# Patient Record
Sex: FEMALE | Race: WHITE | HISPANIC OR LATINO | Employment: UNEMPLOYED | URBAN - METROPOLITAN AREA
[De-identification: names, ages, dates, MRNs, and addresses within clinical notes are randomized per-mention and may not be internally consistent; named-entity substitution may affect disease eponyms.]

---

## 2016-08-29 LAB
EXTERNAL ABO GROUPING: NORMAL
EXTERNAL ANTIBODY SCREEN: NORMAL
EXTERNAL HEMATOCRIT: 34.4 %
EXTERNAL HEMOGLOBIN: 10.4 G/DL
EXTERNAL HEPATITIS B SURFACE ANTIGEN: NEGATIVE
EXTERNAL HIV-1 ANTIBODY: NEGATIVE
EXTERNAL PLATELET COUNT: 321 K/ΜL
EXTERNAL RH FACTOR: POSITIVE
EXTERNAL RUBELLA IGG QUANTITATION: NORMAL
EXTERNAL SYPHILIS RPR SCREEN: NORMAL

## 2017-01-12 ENCOUNTER — GENERIC CONVERSION - ENCOUNTER (OUTPATIENT)
Dept: OTHER | Facility: OTHER | Age: 27
End: 2017-01-12

## 2017-01-12 ENCOUNTER — ALLSCRIPTS OFFICE VISIT (OUTPATIENT)
Dept: OTHER | Facility: OTHER | Age: 27
End: 2017-01-12

## 2017-01-25 ENCOUNTER — TRANSCRIBE ORDERS (OUTPATIENT)
Dept: ADMINISTRATIVE | Facility: HOSPITAL | Age: 27
End: 2017-01-25

## 2017-01-25 ENCOUNTER — APPOINTMENT (OUTPATIENT)
Dept: LAB | Facility: HOSPITAL | Age: 27
End: 2017-01-25
Payer: COMMERCIAL

## 2017-01-25 DIAGNOSIS — Z34.90 PRENATAL CARE, UNSPECIFIED TRIMESTER: ICD-10-CM

## 2017-01-25 DIAGNOSIS — Z34.90 PRENATAL CARE, UNSPECIFIED TRIMESTER: Primary | ICD-10-CM

## 2017-01-25 LAB
BASOPHILS # BLD AUTO: 0.02 THOUSANDS/ΜL (ref 0–0.1)
BASOPHILS NFR BLD AUTO: 0 % (ref 0–1)
EOSINOPHIL # BLD AUTO: 0.15 THOUSAND/ΜL (ref 0–0.61)
EOSINOPHIL NFR BLD AUTO: 2 % (ref 0–6)
ERYTHROCYTE [DISTWIDTH] IN BLOOD BY AUTOMATED COUNT: 13.7 % (ref 11.6–15.1)
GLUCOSE 1H P 50 G GLC PO SERPL-MCNC: 161 MG/DL
HCT VFR BLD AUTO: 32.9 % (ref 34.8–46.1)
HGB BLD-MCNC: 10.9 G/DL (ref 11.5–15.4)
LYMPHOCYTES # BLD AUTO: 2.14 THOUSANDS/ΜL (ref 0.6–4.47)
LYMPHOCYTES NFR BLD AUTO: 22 % (ref 14–44)
MCH RBC QN AUTO: 27.9 PG (ref 26.8–34.3)
MCHC RBC AUTO-ENTMCNC: 33.1 G/DL (ref 31.4–37.4)
MCV RBC AUTO: 84 FL (ref 82–98)
MONOCYTES # BLD AUTO: 0.55 THOUSAND/ΜL (ref 0.17–1.22)
MONOCYTES NFR BLD AUTO: 6 % (ref 4–12)
NEUTROPHILS # BLD AUTO: 6.81 THOUSANDS/ΜL (ref 1.85–7.62)
NEUTS SEG NFR BLD AUTO: 70 % (ref 43–75)
NRBC BLD AUTO-RTO: 0 /100 WBCS
PLATELET # BLD AUTO: 275 THOUSANDS/UL (ref 149–390)
PMV BLD AUTO: 9.4 FL (ref 8.9–12.7)
RBC # BLD AUTO: 3.9 MILLION/UL (ref 3.81–5.12)
RPR SER QL: NORMAL
WBC # BLD AUTO: 9.67 THOUSAND/UL (ref 4.31–10.16)

## 2017-01-25 PROCEDURE — 85025 COMPLETE CBC W/AUTO DIFF WBC: CPT

## 2017-01-25 PROCEDURE — 86592 SYPHILIS TEST NON-TREP QUAL: CPT

## 2017-01-25 PROCEDURE — 36415 COLL VENOUS BLD VENIPUNCTURE: CPT

## 2017-01-25 PROCEDURE — 82950 GLUCOSE TEST: CPT

## 2017-01-26 ENCOUNTER — GENERIC CONVERSION - ENCOUNTER (OUTPATIENT)
Dept: OTHER | Facility: OTHER | Age: 27
End: 2017-01-26

## 2017-01-26 DIAGNOSIS — O99.810 ABNORMAL GLUCOSE COMPLICATING PREGNANCY: ICD-10-CM

## 2017-01-31 ENCOUNTER — GENERIC CONVERSION - ENCOUNTER (OUTPATIENT)
Dept: OTHER | Facility: OTHER | Age: 27
End: 2017-01-31

## 2017-02-02 ENCOUNTER — ALLSCRIPTS OFFICE VISIT (OUTPATIENT)
Dept: OTHER | Facility: OTHER | Age: 27
End: 2017-02-02

## 2017-02-08 ENCOUNTER — APPOINTMENT (OUTPATIENT)
Dept: LAB | Facility: HOSPITAL | Age: 27
End: 2017-02-08
Payer: COMMERCIAL

## 2017-02-08 DIAGNOSIS — O99.810 ABNORMAL GLUCOSE COMPLICATING PREGNANCY: ICD-10-CM

## 2017-02-08 LAB
GLUCOSE 1H P 100 G GLC PO SERPL-MCNC: 174 MG/DL (ref 65–179)
GLUCOSE 2H P 100 G GLC PO SERPL-MCNC: 209 MG/DL (ref 65–154)
GLUCOSE 3H P 100 G GLC PO SERPL-MCNC: 168 MG/DL (ref 65–139)
GLUCOSE P FAST SERPL-MCNC: 82 MG/DL (ref 65–99)

## 2017-02-08 PROCEDURE — 36415 COLL VENOUS BLD VENIPUNCTURE: CPT

## 2017-02-08 PROCEDURE — 82951 GLUCOSE TOLERANCE TEST (GTT): CPT

## 2017-02-08 PROCEDURE — 82952 GTT-ADDED SAMPLES: CPT

## 2017-02-14 ENCOUNTER — GENERIC CONVERSION - ENCOUNTER (OUTPATIENT)
Dept: OTHER | Facility: OTHER | Age: 27
End: 2017-02-14

## 2017-02-20 ENCOUNTER — APPOINTMENT (OUTPATIENT)
Dept: PERINATAL CARE | Facility: CLINIC | Age: 27
End: 2017-02-20
Payer: COMMERCIAL

## 2017-02-20 PROCEDURE — G0108 DIAB MANAGE TRN  PER INDIV: HCPCS | Performed by: OBSTETRICS & GYNECOLOGY

## 2017-02-21 ENCOUNTER — GENERIC CONVERSION - ENCOUNTER (OUTPATIENT)
Dept: OTHER | Facility: OTHER | Age: 27
End: 2017-02-21

## 2017-02-21 ENCOUNTER — ALLSCRIPTS OFFICE VISIT (OUTPATIENT)
Dept: PERINATAL CARE | Facility: CLINIC | Age: 27
End: 2017-02-21
Payer: COMMERCIAL

## 2017-02-21 PROCEDURE — 76816 OB US FOLLOW-UP PER FETUS: CPT | Performed by: OBSTETRICS & GYNECOLOGY

## 2017-02-27 ENCOUNTER — APPOINTMENT (OUTPATIENT)
Dept: PERINATAL CARE | Facility: CLINIC | Age: 27
End: 2017-02-27
Payer: COMMERCIAL

## 2017-02-27 PROCEDURE — G0109 DIAB MANAGE TRN IND/GROUP: HCPCS | Performed by: OBSTETRICS & GYNECOLOGY

## 2017-02-28 ENCOUNTER — GENERIC CONVERSION - ENCOUNTER (OUTPATIENT)
Dept: OTHER | Facility: OTHER | Age: 27
End: 2017-02-28

## 2017-03-13 ENCOUNTER — GENERIC CONVERSION - ENCOUNTER (OUTPATIENT)
Dept: OTHER | Facility: OTHER | Age: 27
End: 2017-03-13

## 2017-03-20 ENCOUNTER — ALLSCRIPTS OFFICE VISIT (OUTPATIENT)
Dept: OTHER | Facility: OTHER | Age: 27
End: 2017-03-20

## 2017-03-20 ENCOUNTER — GENERIC CONVERSION - ENCOUNTER (OUTPATIENT)
Dept: OTHER | Facility: OTHER | Age: 27
End: 2017-03-20

## 2017-03-22 ENCOUNTER — GENERIC CONVERSION - ENCOUNTER (OUTPATIENT)
Dept: OTHER | Facility: OTHER | Age: 27
End: 2017-03-22

## 2017-03-31 ENCOUNTER — ANESTHESIA (INPATIENT)
Dept: LABOR AND DELIVERY | Facility: HOSPITAL | Age: 27
End: 2017-03-31
Payer: COMMERCIAL

## 2017-03-31 ENCOUNTER — HOSPITAL ENCOUNTER (INPATIENT)
Facility: HOSPITAL | Age: 27
LOS: 2 days | Discharge: HOME | End: 2017-04-02
Attending: OBSTETRICS & GYNECOLOGY | Admitting: OBSTETRICS & GYNECOLOGY
Payer: COMMERCIAL

## 2017-03-31 ENCOUNTER — ANESTHESIA EVENT (INPATIENT)
Dept: LABOR AND DELIVERY | Facility: HOSPITAL | Age: 27
End: 2017-03-31
Payer: COMMERCIAL

## 2017-03-31 DIAGNOSIS — Z3A.39 39 WEEKS GESTATION OF PREGNANCY: Primary | ICD-10-CM

## 2017-03-31 DIAGNOSIS — Z98.891 HX OF CESAREAN SECTION: ICD-10-CM

## 2017-03-31 PROBLEM — O24.410 DIET CONTROLLED GESTATIONAL DIABETES MELLITUS (GDM) IN THIRD TRIMESTER: Status: ACTIVE | Noted: 2017-03-31

## 2017-03-31 PROBLEM — Z30.2 STERILIZATION: Status: ACTIVE | Noted: 2017-03-31

## 2017-03-31 LAB
ABO GROUP BLD: NORMAL
BASE EXCESS BLDCOV CALC-SCNC: -4.1 MMOL/L (ref 1–9)
BASOPHILS # BLD AUTO: 0.05 THOUSANDS/ΜL (ref 0–0.1)
BASOPHILS NFR BLD AUTO: 1 % (ref 0–1)
BLD GP AB SCN SERPL QL: NEGATIVE
EOSINOPHIL # BLD AUTO: 0.12 THOUSAND/ΜL (ref 0–0.61)
EOSINOPHIL NFR BLD AUTO: 1 % (ref 0–6)
ERYTHROCYTE [DISTWIDTH] IN BLOOD BY AUTOMATED COUNT: 15.3 % (ref 11.6–15.1)
GLUCOSE SERPL-MCNC: 69 MG/DL (ref 65–140)
HCO3 BLDCOV-SCNC: 21.3 MMOL/L (ref 12.2–28.6)
HCT VFR BLD AUTO: 35.6 % (ref 34.8–46.1)
HGB BLD-MCNC: 11.3 G/DL (ref 11.5–15.4)
LYMPHOCYTES # BLD AUTO: 2.74 THOUSANDS/ΜL (ref 0.6–4.47)
LYMPHOCYTES NFR BLD AUTO: 30 % (ref 14–44)
MCH RBC QN AUTO: 25.7 PG (ref 26.8–34.3)
MCHC RBC AUTO-ENTMCNC: 31.7 G/DL (ref 31.4–37.4)
MCV RBC AUTO: 81 FL (ref 82–98)
MONOCYTES # BLD AUTO: 0.51 THOUSAND/ΜL (ref 0.17–1.22)
MONOCYTES NFR BLD AUTO: 6 % (ref 4–12)
NEUTROPHILS # BLD AUTO: 5.81 THOUSANDS/ΜL (ref 1.85–7.62)
NEUTS SEG NFR BLD AUTO: 62 % (ref 43–75)
NRBC BLD AUTO-RTO: 0 /100 WBCS
OXYHGB MFR BLDCOV: 58.6 %
PCO2 BLDCOV: 39.9 MM HG (ref 27–43)
PH BLDCOV: 7.34 [PH] (ref 7.19–7.49)
PLATELET # BLD AUTO: 263 THOUSANDS/UL (ref 149–390)
PMV BLD AUTO: 9.5 FL (ref 8.9–12.7)
PO2 BLDCOV: 28.7 MM HG (ref 15–45)
RBC # BLD AUTO: 4.39 MILLION/UL (ref 3.81–5.12)
RH BLD: POSITIVE
RPR SER QL: NORMAL
SAO2 % BLDCOV: 10.3 ML/DL
WBC # BLD AUTO: 9.23 THOUSAND/UL (ref 4.31–10.16)

## 2017-03-31 PROCEDURE — 85025 COMPLETE CBC W/AUTO DIFF WBC: CPT | Performed by: OBSTETRICS & GYNECOLOGY

## 2017-03-31 PROCEDURE — 86901 BLOOD TYPING SEROLOGIC RH(D): CPT | Performed by: OBSTETRICS & GYNECOLOGY

## 2017-03-31 PROCEDURE — 86592 SYPHILIS TEST NON-TREP QUAL: CPT | Performed by: OBSTETRICS & GYNECOLOGY

## 2017-03-31 PROCEDURE — 86850 RBC ANTIBODY SCREEN: CPT | Performed by: OBSTETRICS & GYNECOLOGY

## 2017-03-31 PROCEDURE — 0UB70ZZ EXCISION OF BILATERAL FALLOPIAN TUBES, OPEN APPROACH: ICD-10-PCS | Performed by: OBSTETRICS & GYNECOLOGY

## 2017-03-31 PROCEDURE — 4A1HXCZ MONITORING OF PRODUCTS OF CONCEPTION, CARDIAC RATE, EXTERNAL APPROACH: ICD-10-PCS | Performed by: OBSTETRICS & GYNECOLOGY

## 2017-03-31 PROCEDURE — 88302 TISSUE EXAM BY PATHOLOGIST: CPT | Performed by: OBSTETRICS & GYNECOLOGY

## 2017-03-31 PROCEDURE — 86900 BLOOD TYPING SEROLOGIC ABO: CPT | Performed by: OBSTETRICS & GYNECOLOGY

## 2017-03-31 PROCEDURE — 82805 BLOOD GASES W/O2 SATURATION: CPT | Performed by: OBSTETRICS & GYNECOLOGY

## 2017-03-31 PROCEDURE — 82948 REAGENT STRIP/BLOOD GLUCOSE: CPT

## 2017-03-31 RX ORDER — IBUPROFEN 600 MG/1
600 TABLET ORAL EVERY 6 HOURS PRN
Status: DISCONTINUED | OUTPATIENT
Start: 2017-04-01 | End: 2017-04-02 | Stop reason: HOSPADM

## 2017-03-31 RX ORDER — SODIUM CHLORIDE 9 MG/ML
125 INJECTION, SOLUTION INTRAVENOUS CONTINUOUS
Status: DISCONTINUED | OUTPATIENT
Start: 2017-03-31 | End: 2017-04-02 | Stop reason: HOSPADM

## 2017-03-31 RX ORDER — OXYCODONE HYDROCHLORIDE AND ACETAMINOPHEN 5; 325 MG/1; MG/1
2 TABLET ORAL EVERY 4 HOURS PRN
Status: DISPENSED | OUTPATIENT
Start: 2017-03-31 | End: 2017-04-01

## 2017-03-31 RX ORDER — OXYCODONE HYDROCHLORIDE AND ACETAMINOPHEN 5; 325 MG/1; MG/1
2 TABLET ORAL EVERY 4 HOURS PRN
Status: DISCONTINUED | OUTPATIENT
Start: 2017-04-01 | End: 2017-04-02 | Stop reason: HOSPADM

## 2017-03-31 RX ORDER — ACETAMINOPHEN 325 MG/1
650 TABLET ORAL EVERY 6 HOURS PRN
Status: DISCONTINUED | OUTPATIENT
Start: 2017-03-31 | End: 2017-04-02 | Stop reason: HOSPADM

## 2017-03-31 RX ORDER — NALBUPHINE HCL 10 MG/ML
5 AMPUL (ML) INJECTION
Status: ACTIVE | OUTPATIENT
Start: 2017-03-31 | End: 2017-04-01

## 2017-03-31 RX ORDER — MORPHINE SULFATE 0.5 MG/ML
INJECTION, SOLUTION EPIDURAL; INTRATHECAL; INTRAVENOUS
Status: COMPLETED
Start: 2017-03-31 | End: 2017-03-31

## 2017-03-31 RX ORDER — ONDANSETRON 2 MG/ML
4 INJECTION INTRAMUSCULAR; INTRAVENOUS EVERY 8 HOURS PRN
Status: DISCONTINUED | OUTPATIENT
Start: 2017-03-31 | End: 2017-03-31

## 2017-03-31 RX ORDER — KETOROLAC TROMETHAMINE 30 MG/ML
INJECTION, SOLUTION INTRAMUSCULAR; INTRAVENOUS AS NEEDED
Status: DISCONTINUED | OUTPATIENT
Start: 2017-03-31 | End: 2017-03-31 | Stop reason: SURG

## 2017-03-31 RX ORDER — BUPIVACAINE HYDROCHLORIDE 7.5 MG/ML
INJECTION, SOLUTION INTRASPINAL AS NEEDED
Status: DISCONTINUED | OUTPATIENT
Start: 2017-03-31 | End: 2017-03-31 | Stop reason: SURG

## 2017-03-31 RX ORDER — ONDANSETRON 2 MG/ML
4 INJECTION INTRAMUSCULAR; INTRAVENOUS ONCE
Status: DISCONTINUED | OUTPATIENT
Start: 2017-03-31 | End: 2017-04-02 | Stop reason: HOSPADM

## 2017-03-31 RX ORDER — ONDANSETRON 2 MG/ML
4 INJECTION INTRAMUSCULAR; INTRAVENOUS EVERY 4 HOURS PRN
Status: ACTIVE | OUTPATIENT
Start: 2017-03-31 | End: 2017-04-01

## 2017-03-31 RX ORDER — DIAPER,BRIEF,INFANT-TODD,DISP
1 EACH MISCELLANEOUS DAILY PRN
Status: DISCONTINUED | OUTPATIENT
Start: 2017-03-31 | End: 2017-04-02 | Stop reason: HOSPADM

## 2017-03-31 RX ORDER — EPHEDRINE SULFATE 50 MG/ML
INJECTION, SOLUTION INTRAVENOUS AS NEEDED
Status: DISCONTINUED | OUTPATIENT
Start: 2017-03-31 | End: 2017-03-31 | Stop reason: SURG

## 2017-03-31 RX ORDER — OXYCODONE HYDROCHLORIDE AND ACETAMINOPHEN 5; 325 MG/1; MG/1
1 TABLET ORAL EVERY 4 HOURS PRN
Status: DISCONTINUED | OUTPATIENT
Start: 2017-04-01 | End: 2017-04-02 | Stop reason: HOSPADM

## 2017-03-31 RX ORDER — ONDANSETRON 2 MG/ML
4 INJECTION INTRAMUSCULAR; INTRAVENOUS EVERY 8 HOURS PRN
Status: DISCONTINUED | OUTPATIENT
Start: 2017-04-01 | End: 2017-04-02 | Stop reason: HOSPADM

## 2017-03-31 RX ORDER — SODIUM CHLORIDE 9 MG/ML
125 INJECTION, SOLUTION INTRAVENOUS CONTINUOUS
Status: DISCONTINUED | OUTPATIENT
Start: 2017-03-31 | End: 2017-03-31

## 2017-03-31 RX ORDER — METOCLOPRAMIDE HYDROCHLORIDE 5 MG/ML
5 INJECTION INTRAMUSCULAR; INTRAVENOUS EVERY 6 HOURS PRN
Status: ACTIVE | OUTPATIENT
Start: 2017-03-31 | End: 2017-04-01

## 2017-03-31 RX ORDER — OXYTOCIN/RINGER'S LACTATE 30/500 ML
62.5 PLASTIC BAG, INJECTION (ML) INTRAVENOUS ONCE
Status: COMPLETED | OUTPATIENT
Start: 2017-03-31 | End: 2017-03-31

## 2017-03-31 RX ORDER — DIPHENHYDRAMINE HCL 25 MG
25 TABLET ORAL EVERY 6 HOURS PRN
Status: DISCONTINUED | OUTPATIENT
Start: 2017-04-01 | End: 2017-04-02 | Stop reason: HOSPADM

## 2017-03-31 RX ORDER — DIPHENHYDRAMINE HYDROCHLORIDE 50 MG/ML
25 INJECTION INTRAMUSCULAR; INTRAVENOUS EVERY 6 HOURS PRN
Status: ACTIVE | OUTPATIENT
Start: 2017-03-31 | End: 2017-04-01

## 2017-03-31 RX ORDER — DOCUSATE SODIUM 100 MG/1
100 CAPSULE, LIQUID FILLED ORAL 2 TIMES DAILY
Status: DISCONTINUED | OUTPATIENT
Start: 2017-03-31 | End: 2017-04-02 | Stop reason: HOSPADM

## 2017-03-31 RX ADMIN — SODIUM CHLORIDE 999 ML/HR: 0.9 INJECTION, SOLUTION INTRAVENOUS at 08:30

## 2017-03-31 RX ADMIN — Medication 62.5 MILLI-UNITS/MIN: at 12:48

## 2017-03-31 RX ADMIN — DOCUSATE SODIUM 100 MG: 100 CAPSULE, LIQUID FILLED ORAL at 12:53

## 2017-03-31 RX ADMIN — BUPIVACAINE HYDROCHLORIDE IN DEXTROSE 1.6 ML: 7.5 INJECTION, SOLUTION SUBARACHNOID at 09:14

## 2017-03-31 RX ADMIN — SODIUM CHLORIDE 125 ML/HR: 0.9 INJECTION, SOLUTION INTRAVENOUS at 18:02

## 2017-03-31 RX ADMIN — MORPHINE SULFATE 0.15 MG: 0.5 INJECTION, SOLUTION EPIDURAL; INTRATHECAL; INTRAVENOUS at 09:14

## 2017-03-31 RX ADMIN — DOCUSATE SODIUM 100 MG: 100 CAPSULE, LIQUID FILLED ORAL at 17:24

## 2017-03-31 RX ADMIN — SODIUM CHLORIDE 999 ML/HR: 0.9 INJECTION, SOLUTION INTRAVENOUS at 08:59

## 2017-03-31 RX ADMIN — KETOROLAC TROMETHAMINE 30 MG: 30 INJECTION, SOLUTION INTRAMUSCULAR at 10:08

## 2017-03-31 RX ADMIN — SODIUM CHLORIDE: 0.9 INJECTION, SOLUTION INTRAVENOUS at 09:10

## 2017-03-31 RX ADMIN — EPHEDRINE SULFATE 5 MG: 50 INJECTION, SOLUTION INTRAMUSCULAR; INTRAVENOUS; SUBCUTANEOUS at 09:17

## 2017-03-31 RX ADMIN — ONDANSETRON HYDROCHLORIDE 4 MG: 2 INJECTION, SOLUTION INTRAVENOUS at 09:07

## 2017-03-31 RX ADMIN — CEFAZOLIN SODIUM 2000 MG: 2 SOLUTION INTRAVENOUS at 09:11

## 2017-03-31 RX ADMIN — SODIUM CHLORIDE 125 ML/HR: 0.9 INJECTION, SOLUTION INTRAVENOUS at 11:15

## 2017-03-31 RX ADMIN — OXYTOCIN 250 MILLI-UNITS/MIN: 10 INJECTION, SOLUTION INTRAMUSCULAR; INTRAVENOUS at 09:34

## 2017-03-31 RX ADMIN — Medication 1 TABLET: at 12:53

## 2017-04-01 LAB
BASOPHILS # BLD AUTO: 0.04 THOUSANDS/ΜL (ref 0–0.1)
BASOPHILS NFR BLD AUTO: 0 % (ref 0–1)
EOSINOPHIL # BLD AUTO: 0.11 THOUSAND/ΜL (ref 0–0.61)
EOSINOPHIL NFR BLD AUTO: 1 % (ref 0–6)
ERYTHROCYTE [DISTWIDTH] IN BLOOD BY AUTOMATED COUNT: 15.4 % (ref 11.6–15.1)
HCT VFR BLD AUTO: 30.2 % (ref 34.8–46.1)
HGB BLD-MCNC: 9.7 G/DL (ref 11.5–15.4)
LYMPHOCYTES # BLD AUTO: 2.74 THOUSANDS/ΜL (ref 0.6–4.47)
LYMPHOCYTES NFR BLD AUTO: 25 % (ref 14–44)
MCH RBC QN AUTO: 26 PG (ref 26.8–34.3)
MCHC RBC AUTO-ENTMCNC: 32.1 G/DL (ref 31.4–37.4)
MCV RBC AUTO: 81 FL (ref 82–98)
MONOCYTES # BLD AUTO: 0.6 THOUSAND/ΜL (ref 0.17–1.22)
MONOCYTES NFR BLD AUTO: 5 % (ref 4–12)
NEUTROPHILS # BLD AUTO: 7.71 THOUSANDS/ΜL (ref 1.85–7.62)
NEUTS SEG NFR BLD AUTO: 69 % (ref 43–75)
NRBC BLD AUTO-RTO: 0 /100 WBCS
PLATELET # BLD AUTO: 213 THOUSANDS/UL (ref 149–390)
PMV BLD AUTO: 9.5 FL (ref 8.9–12.7)
RBC # BLD AUTO: 3.73 MILLION/UL (ref 3.81–5.12)
WBC # BLD AUTO: 11.2 THOUSAND/UL (ref 4.31–10.16)

## 2017-04-01 PROCEDURE — 85025 COMPLETE CBC W/AUTO DIFF WBC: CPT | Performed by: OBSTETRICS & GYNECOLOGY

## 2017-04-01 RX ADMIN — OXYCODONE HYDROCHLORIDE AND ACETAMINOPHEN 2 TABLET: 5; 325 TABLET ORAL at 01:00

## 2017-04-01 RX ADMIN — DOCUSATE SODIUM 100 MG: 100 CAPSULE, LIQUID FILLED ORAL at 17:00

## 2017-04-01 RX ADMIN — OXYCODONE HYDROCHLORIDE AND ACETAMINOPHEN 2 TABLET: 5; 325 TABLET ORAL at 13:02

## 2017-04-01 RX ADMIN — OXYCODONE HYDROCHLORIDE AND ACETAMINOPHEN 2 TABLET: 5; 325 TABLET ORAL at 07:32

## 2017-04-01 RX ADMIN — IBUPROFEN 600 MG: 600 TABLET, FILM COATED ORAL at 20:43

## 2017-04-01 RX ADMIN — OXYCODONE HYDROCHLORIDE AND ACETAMINOPHEN 2 TABLET: 5; 325 TABLET ORAL at 17:02

## 2017-04-01 RX ADMIN — OXYCODONE HYDROCHLORIDE AND ACETAMINOPHEN 2 TABLET: 5; 325 TABLET ORAL at 22:17

## 2017-04-01 RX ADMIN — Medication 1 TABLET: at 07:32

## 2017-04-02 VITALS
WEIGHT: 153 LBS | HEART RATE: 72 BPM | DIASTOLIC BLOOD PRESSURE: 75 MMHG | BODY MASS INDEX: 26.12 KG/M2 | HEIGHT: 64 IN | RESPIRATION RATE: 16 BRPM | OXYGEN SATURATION: 97 % | TEMPERATURE: 98.2 F | SYSTOLIC BLOOD PRESSURE: 113 MMHG

## 2017-04-02 RX ORDER — OXYCODONE HYDROCHLORIDE AND ACETAMINOPHEN 5; 325 MG/1; MG/1
1-2 TABLET ORAL EVERY 4 HOURS PRN
Qty: 30 TABLET | Refills: 0 | Status: SHIPPED | OUTPATIENT
Start: 2017-04-02 | End: 2017-04-12

## 2017-04-02 RX ORDER — DOCUSATE SODIUM 100 MG/1
100 CAPSULE, LIQUID FILLED ORAL 2 TIMES DAILY
Qty: 60 CAPSULE | Refills: 0
Start: 2017-04-02 | End: 2017-05-02

## 2017-04-02 RX ORDER — IBUPROFEN 600 MG/1
600 TABLET ORAL EVERY 6 HOURS PRN
Qty: 30 TABLET | Refills: 0 | Status: SHIPPED | OUTPATIENT
Start: 2017-04-02 | End: 2019-10-17

## 2017-04-02 RX ADMIN — OXYCODONE HYDROCHLORIDE AND ACETAMINOPHEN 1 TABLET: 5; 325 TABLET ORAL at 10:10

## 2017-04-02 RX ADMIN — DOCUSATE SODIUM 100 MG: 100 CAPSULE, LIQUID FILLED ORAL at 10:08

## 2017-04-02 RX ADMIN — IBUPROFEN 600 MG: 600 TABLET, FILM COATED ORAL at 10:08

## 2017-04-02 RX ADMIN — OXYCODONE HYDROCHLORIDE AND ACETAMINOPHEN 2 TABLET: 5; 325 TABLET ORAL at 04:54

## 2017-04-03 ENCOUNTER — GENERIC CONVERSION - ENCOUNTER (OUTPATIENT)
Dept: OTHER | Facility: OTHER | Age: 27
End: 2017-04-03

## 2017-04-03 ENCOUNTER — ALLSCRIPTS OFFICE VISIT (OUTPATIENT)
Dept: OTHER | Facility: OTHER | Age: 27
End: 2017-04-03

## 2017-04-03 ENCOUNTER — ANESTHESIA EVENT (EMERGENCY)
Dept: EMERGENCY DEPT | Facility: HOSPITAL | Age: 27
End: 2017-04-03
Payer: COMMERCIAL

## 2017-04-03 ENCOUNTER — ANESTHESIA (EMERGENCY)
Dept: EMERGENCY DEPT | Facility: HOSPITAL | Age: 27
End: 2017-04-03
Payer: COMMERCIAL

## 2017-04-03 ENCOUNTER — HOSPITAL ENCOUNTER (EMERGENCY)
Facility: HOSPITAL | Age: 27
Discharge: HOME | End: 2017-04-03
Admitting: OBSTETRICS & GYNECOLOGY
Payer: COMMERCIAL

## 2017-04-03 VITALS
OXYGEN SATURATION: 99 % | RESPIRATION RATE: 16 BRPM | DIASTOLIC BLOOD PRESSURE: 67 MMHG | BODY MASS INDEX: 24.72 KG/M2 | SYSTOLIC BLOOD PRESSURE: 122 MMHG | TEMPERATURE: 98.1 F | HEART RATE: 72 BPM | WEIGHT: 144 LBS

## 2017-04-03 DIAGNOSIS — R51 HEADACHE(784.0): Primary | ICD-10-CM

## 2017-04-03 PROBLEM — G97.1 SPINAL HEADACHE: Status: ACTIVE | Noted: 2017-04-03

## 2017-04-03 PROCEDURE — 96360 HYDRATION IV INFUSION INIT: CPT

## 2017-04-03 PROCEDURE — 96361 HYDRATE IV INFUSION ADD-ON: CPT

## 2017-04-03 PROCEDURE — 99283 EMERGENCY DEPT VISIT LOW MDM: CPT

## 2017-04-03 RX ORDER — CAFFEINE 200 MG
300 TABLET ORAL ONCE
Status: COMPLETED | OUTPATIENT
Start: 2017-04-03 | End: 2017-04-03

## 2017-04-03 RX ORDER — SODIUM CHLORIDE 9 MG/ML
999 INJECTION, SOLUTION INTRAVENOUS ONCE
Status: DISCONTINUED | OUTPATIENT
Start: 2017-04-03 | End: 2017-04-03 | Stop reason: HOSPADM

## 2017-04-03 RX ORDER — SODIUM CHLORIDE 9 MG/ML
999 INJECTION, SOLUTION INTRAVENOUS ONCE
Status: COMPLETED | OUTPATIENT
Start: 2017-04-03 | End: 2017-04-03

## 2017-04-03 RX ADMIN — SODIUM CHLORIDE 999 ML/HR: 0.9 INJECTION, SOLUTION INTRAVENOUS at 11:34

## 2017-04-03 RX ADMIN — SODIUM CHLORIDE 1000 ML: 0.9 INJECTION, SOLUTION INTRAVENOUS at 12:56

## 2017-04-03 RX ADMIN — CAFFEINE 300 MG: 200 TABLET ORAL at 11:40

## 2017-04-05 ENCOUNTER — TELEPHONE (OUTPATIENT)
Dept: LABOR AND DELIVERY | Facility: HOSPITAL | Age: 27
End: 2017-04-05

## 2017-04-10 LAB — PLACENTA IN STORAGE: NORMAL

## 2017-04-20 ENCOUNTER — TELEPHONE (OUTPATIENT)
Dept: LABOR AND DELIVERY | Facility: HOSPITAL | Age: 27
End: 2017-04-20

## 2017-04-25 ENCOUNTER — GENERIC CONVERSION - ENCOUNTER (OUTPATIENT)
Dept: OTHER | Facility: OTHER | Age: 27
End: 2017-04-25

## 2017-04-25 ENCOUNTER — ALLSCRIPTS OFFICE VISIT (OUTPATIENT)
Dept: OTHER | Facility: OTHER | Age: 27
End: 2017-04-25

## 2017-06-05 ENCOUNTER — GENERIC CONVERSION - ENCOUNTER (OUTPATIENT)
Dept: OTHER | Facility: OTHER | Age: 27
End: 2017-06-05

## 2017-06-05 DIAGNOSIS — Z86.32 HISTORY OF GESTATIONAL DIABETES: ICD-10-CM

## 2017-06-06 ENCOUNTER — ALLSCRIPTS OFFICE VISIT (OUTPATIENT)
Dept: OTHER | Facility: OTHER | Age: 27
End: 2017-06-06

## 2017-06-06 ENCOUNTER — APPOINTMENT (OUTPATIENT)
Dept: LAB | Facility: HOSPITAL | Age: 27
End: 2017-06-06
Payer: COMMERCIAL

## 2017-06-06 ENCOUNTER — TRANSCRIBE ORDERS (OUTPATIENT)
Dept: ADMINISTRATIVE | Facility: HOSPITAL | Age: 27
End: 2017-06-06

## 2017-06-06 DIAGNOSIS — Z86.32 HISTORY OF GESTATIONAL DIABETES: ICD-10-CM

## 2017-06-06 DIAGNOSIS — R42 DIZZINESS AND GIDDINESS: Primary | ICD-10-CM

## 2017-06-06 DIAGNOSIS — R42 DIZZINESS AND GIDDINESS: ICD-10-CM

## 2017-06-06 LAB
BASOPHILS # BLD AUTO: 0.1 THOUSANDS/ΜL (ref 0–0.1)
BASOPHILS NFR BLD AUTO: 1 % (ref 0–1)
EOSINOPHIL # BLD AUTO: 2.57 THOUSAND/ΜL (ref 0–0.61)
EOSINOPHIL NFR BLD AUTO: 28 % (ref 0–6)
ERYTHROCYTE [DISTWIDTH] IN BLOOD BY AUTOMATED COUNT: 16.6 % (ref 11.6–15.1)
GLUCOSE 2H P 75 G GLC PO SERPL-MCNC: 113 MG/DL (ref 70–183)
GLUCOSE P FAST SERPL-MCNC: 93 MG/DL (ref 65–99)
HCT VFR BLD AUTO: 40.1 % (ref 34.8–46.1)
HGB BLD-MCNC: 13.3 G/DL (ref 11.5–15.4)
LYMPHOCYTES # BLD AUTO: 2.99 THOUSANDS/ΜL (ref 0.6–4.47)
LYMPHOCYTES NFR BLD AUTO: 33 % (ref 14–44)
MCH RBC QN AUTO: 27.4 PG (ref 26.8–34.3)
MCHC RBC AUTO-ENTMCNC: 33.2 G/DL (ref 31.4–37.4)
MCV RBC AUTO: 83 FL (ref 82–98)
MONOCYTES # BLD AUTO: 0.35 THOUSAND/ΜL (ref 0.17–1.22)
MONOCYTES NFR BLD AUTO: 4 % (ref 4–12)
NEUTROPHILS # BLD AUTO: 3.19 THOUSANDS/ΜL (ref 1.85–7.62)
NEUTS SEG NFR BLD AUTO: 34 % (ref 43–75)
NRBC BLD AUTO-RTO: 0 /100 WBCS
PLATELET # BLD AUTO: 260 THOUSANDS/UL (ref 149–390)
PMV BLD AUTO: 9.4 FL (ref 8.9–12.7)
RBC # BLD AUTO: 4.85 MILLION/UL (ref 3.81–5.12)
WBC # BLD AUTO: 9.2 THOUSAND/UL (ref 4.31–10.16)

## 2017-06-06 PROCEDURE — 36415 COLL VENOUS BLD VENIPUNCTURE: CPT

## 2017-06-06 PROCEDURE — 85025 COMPLETE CBC W/AUTO DIFF WBC: CPT

## 2017-06-06 PROCEDURE — 82947 ASSAY GLUCOSE BLOOD QUANT: CPT

## 2017-06-06 PROCEDURE — 82950 GLUCOSE TEST: CPT

## 2017-06-08 ENCOUNTER — GENERIC CONVERSION - ENCOUNTER (OUTPATIENT)
Dept: OTHER | Facility: OTHER | Age: 27
End: 2017-06-08

## 2017-06-09 ENCOUNTER — ALLSCRIPTS OFFICE VISIT (OUTPATIENT)
Dept: OTHER | Facility: OTHER | Age: 27
End: 2017-06-09

## 2018-01-11 NOTE — PROGRESS NOTES
SEP 28 2016         RE: Monik Garcia                           To: Yasmine   MR#: [id number]                                   [address] [place] [address]   : [place]  [address] [place] Adi Reis  ENC: [id number]                             [facility], [address] [place] [address]   (Exam #: [id number])                           Fax: [phone]      The LMP of this 32year old,  G3, P2-0-0-2 patient was 2016, giving   her an TOBIAS of 2017 and a current gestational age of 16 weeks 5 days   by dates  A sonographic examination was performed on SEP 28 2016 using   real time equipment  The ultrasound examination was performed using   abdominal technique  The patient has a BMI of 22 8  Her blood pressure   today was 108/73  Earliest ultrasound found in her record: 16  9w2d 17 TOBIAS Multiple   longitudinal and transverse sections revealed a hunt intrauterine   pregnancy with the fetus in variable presentation  The placenta is   anterior in implantation, grade 0 in appearance, and there is no placenta   previa  Cardiac motion was observed at 166 bpm       INDICATIONS      first trimester genetic screening   Previous C/S x 2      Exam Types      Level I   sequential screen      RESULTS      Fetus # 1 of 1   Variable presentation   Fetal growth appeared normal      MEASUREMENTS (* Included In Average GA)      CRL              6 4 cm        12 weeks 4 days*   Nuchal Trans    1 40 mm      THE AVERAGE GESTATIONAL AGE is 12 weeks 4 days +/- 7 days  UTERINE ARTERIES                                  S/D   PI    RI    NOTCH       Left Uterine Artery              1 53         No       Right Uterine Artery             2 03         No      ANATOMY COMMENTS      Anatomic detail is limited at this gestational age  The yolk sac was not   noted    The fetal cranium appeared normal in shape and the nuchal   translucency was normal in size (1 4mm)  The nasal bone could not be   interrogated due to persistent fetal prone position  The intracranial   anatomy was unremarkable  Evaluation of the spine revealed no obvious   evidence for a neural tube defect  Anatomy of the fetal thorax appeared   within normal limits  The cardiac rhythm was regular  Within the abdomen,   stomach & bladder were visualized and the abdominal wall appeared intact  A three vessel cord appears to be present  Active movement of the fetal   body & extremities was seen  There is no suspicion of a subchorionic   bleed  The placental cord insertion was normal    The uterine artery   Dopplers are abnormal for this gestational age  There is no suspicion of a   uterine myoma  Free fluid is not seen in the posterior cul-de-sac  ADNEXA      The left ovary appeared normal and measured 3 4 x 2 3 x 2 4 cm with a   volume of 9 8 cc  The right ovary appeared normal and measured 2 4 x 2 5 x   2 0 cm with a volume of 6 3 cc       AMNIOTIC FLUID         Largest Vertical Pocket = 3 1 cm   Amniotic Fluid: Normal      IMPRESSION      Perkins IUP   12 weeks and 4 days by this ultrasound  (TOBIAS=2017)   Variable presentation   Fetal growth appeared normal   Regular fetal heart rate of 166 bpm   Anterior placenta   No placenta previa      CONSULT COMMENT      OFFICE VISIT      Thank you very much for referring this very nice patient for a    evaluation and sequential screening ultrasound  The patient has had 2   previous pregnancies, both of which were  sections at term  She   has no significant medical or surgical history  Her medications include   prenatal vitamins and she has an allergy to aspirin which causes facial   swelling  She denies the current use of tobacco, alcohol, or drugs  Her   family medical history is noncontributory  A review of systems is   otherwise negative    On exam, the patient appears well, in no acute   distress, and her abdomen is nontender  We discussed the options for genetic screening, including but not limited   to first trimester screening, second trimester screening, combined first   and second trimester screening, noninvasive prenatal testing (NIPT) for   patients at high risk and diagnostic screening through the use of CVS and   amniocentesis  We discussed the risks and benefits of each approach   including the sensitivities and false positive rates as well as the   difference between a screening test and a diagnostic test   At the   conclusion of our discussion the patient elected Sequential Screening to   delineate her risk for fetal aneuploidy  A maternal blood sample was   obtained on the day of sonogram   The first trimester portion of the   screening results, encompassing age, nuchal translucency, and biochemistry   should be available within one week of testing and will be reported from   [address] [place] [address]   The second stage of sequential screening should be completed   between the 15th and 21st week of pregnancy (ideally between 16-18 weeks)  Increased resistance indices are appreciated in both uterine arteries on   today's ultrasound  This finding has been associated with poor placental   perfusion and intrauterine growth restriction  In light of today's   uterine artery findings, usually recommend initiation of low-dose aspirin   however the patient has an allergy to aspirin  Recommend an anatomy ultrasound be scheduled for 20 weeks gestation  Please note, in addition to the time spent discussing the results of the   ultrasound, I spent approximately 10 minutes of face-to-face time with the   patient, greater than 50% of which was spent in counseling and the   coordination of care for this patient  Thank you very much for allowing us to participate in the care of this   very nice patient  Should you have any questions, please do not hesitate   to contact our office        Eleni Gaspar KRISTOPHER Martinez     Electronically signed 09/28/16 15:19

## 2018-01-12 VITALS
BODY MASS INDEX: 20.83 KG/M2 | DIASTOLIC BLOOD PRESSURE: 70 MMHG | WEIGHT: 122 LBS | HEIGHT: 64 IN | TEMPERATURE: 97 F | SYSTOLIC BLOOD PRESSURE: 102 MMHG | HEART RATE: 88 BPM | RESPIRATION RATE: 18 BRPM | OXYGEN SATURATION: 98 %

## 2018-01-12 NOTE — MISCELLANEOUS
Message  message left for patient regarding need for 3 hour glucose  Lab slip mailed to patient      Active Problems    1  Abnormal glucose in pregnancy, antepartum (648 83) (O99 810)   2  Abnormal weight loss (783 21) (R63 4)   3  Anemia complicating pregnancy (632 56,998 2) (O99 019)   4  Contraceptives (V25 02)   5  Encounter for gynecological examination with Papanicolaou smear of cervix   (V72 31,V76 2) (Z01 419,Z12 4)   6  Encounter for pregnancy related examination, first trimester (V22 1) (Z34 81)   7  Headache (784 0) (R51)   8  Nausea (787 02) (R11 0)   9  Need for prophylactic vaccination and inoculation against influenza (V04 81) (Z23)   10  Normal pregnancy (V22 2) (Z34 90)   11  Normal Routine History And Physical - Postpartum (V24 2)   12  Oral thrush (112 0) (B37 0)   13  Screening for STD (sexually transmitted disease) (V74 5) (Z11 3)    Current Meds   1  Ferrous Sulfate 325 (65 Fe) MG Oral Tablet; TAKE 1 TABLET DAILY AS DIRECTED; Therapy: 17MWO8149 to (Evaluate:21Zpv4676)  Requested for: 04Oct2016; Last   Rx:83Adf4103 Ordered   2  Prenatal Vitamins 0 8 MG Oral Tablet; TAKE 1 TABLET DAILY; Therapy: 88VYB2766 to (Evaluate:84Ktt8445)  Requested for: 91Itz3730; Last   Rx:05Zqu2546 Ordered    Allergies    1  ASA/Extra Strength Antacid TABS   2   Aspirin EC TBEC    Signatures   Electronically signed by : Dago Thomas, ; Jan 31 2017  2:00PM EST                       (Author)

## 2018-01-12 NOTE — MISCELLANEOUS
Provider Comments  Provider Comments:   PT NO SHOW FOR PN PT DID CALL TO R/S SHE LEFT MSG I RETURN CALL LEFT HER   MSG TO PLEASE CALL BACK TO R/S ALSO SENDING LETTER      Signatures   Electronically signed by : Eleonora Guillen, ; Jan 26 2017 11:35AM EST                       (Author)

## 2018-01-13 VITALS
DIASTOLIC BLOOD PRESSURE: 70 MMHG | BODY MASS INDEX: 21.06 KG/M2 | RESPIRATION RATE: 18 BRPM | SYSTOLIC BLOOD PRESSURE: 110 MMHG | OXYGEN SATURATION: 97 % | TEMPERATURE: 97.2 F | HEART RATE: 84 BPM | HEIGHT: 64 IN | WEIGHT: 123.38 LBS

## 2018-01-13 VITALS
HEIGHT: 64 IN | WEIGHT: 150 LBS | BODY MASS INDEX: 25.61 KG/M2 | DIASTOLIC BLOOD PRESSURE: 77 MMHG | SYSTOLIC BLOOD PRESSURE: 113 MMHG

## 2018-01-13 VITALS — BODY MASS INDEX: 24.89 KG/M2 | WEIGHT: 145 LBS | SYSTOLIC BLOOD PRESSURE: 116 MMHG | DIASTOLIC BLOOD PRESSURE: 82 MMHG

## 2018-01-14 VITALS
BODY MASS INDEX: 25.95 KG/M2 | DIASTOLIC BLOOD PRESSURE: 70 MMHG | SYSTOLIC BLOOD PRESSURE: 116 MMHG | TEMPERATURE: 97.9 F | WEIGHT: 152 LBS | RESPIRATION RATE: 18 BRPM | HEIGHT: 64 IN | HEART RATE: 99 BPM | OXYGEN SATURATION: 98 %

## 2018-01-14 NOTE — MISCELLANEOUS
Message  C/section 3/31/2017  -Breast feeding  -being seen in office today for headache  -3 week postpartum appointment given  Active Problems    1  Abnormal weight loss (783 21) (R63 4)   2  Anemia complicating pregnancy (182 87,021 1) (O99 019)   3  Diet controlled gestational diabetes mellitus (GDM) in third trimester (648 83) (O24 410)   4  Encounter for pregnancy related examination in third trimester (V22 1) (Z34 83)   5  Headache (784 0) (R51)   6  Nausea (787 02) (R11 0)   7  Need for prophylactic vaccination and inoculation against influenza (V04 81) (Z23)   8  Normal pregnancy (V22 2) (Z34 90)   9  Routine Postpartum History And Physical (V24 2)   10  Screening for STD (sexually transmitted disease) (V74 5) (Z11 3)    Current Meds   1  Prenatal Vitamins 0 8 MG Oral Tablet; TAKE 1 TABLET DAILY; Therapy: 68XHB9380 to (Evaluate:48Ugo0842)  Requested for: 27Eal9901; Last   Rx:91Ald6592 Ordered    Allergies    1  ASA/Extra Strength Antacid TABS   2  Aspirin EC TBEC    3  No Known Environmental Allergies   4   No Known Food Allergies    Signatures   Electronically signed by : Garrett Dahl, ; Apr  3 2017 10:18AM EST                       (Author)

## 2018-01-14 NOTE — MISCELLANEOUS
Message  Spoke to patient on the phone, let her know that her 2 hour blood sugar is normal  She is having vertigo still  I told her to follow her PCP with those symptoms and he could refer her to a specialist or send her for any imaging if need be  Also to make sure she changes positions slowly and stay hydrated  Active Problems    1  Abnormal weight loss (783 21) (R63 4)   2  Acute upper respiratory infection (465 9) (J06 9)   3  Breast lump (611 72) (N63)   4  Dysfunction of both eustachian tubes (381 81) (H69 83)   5  Headache (784 0) (R51)   6  History of gestational diabetes (V12 21) (Z86 32)   7  Lactating mother (V24 1) (Z39 1)   8  Lightheadedness (780 4) (R42)   9  Nausea (787 02) (R11 0)   10  Need for prophylactic vaccination and inoculation against influenza (V04 81) (Z23)   11  Postpartum follow-up (V24 2) (Z39 2)   12  Screening for STD (sexually transmitted disease) (V74 5) (Z11 3)   13  Spinal headache (349 0) (G97 1)   14  Vertigo (780 4) (R42)    Current Meds   1  Prenatal Vitamins 0 8 MG Oral Tablet; TAKE 1 TABLET DAILY; Therapy: 62TUS1394 to (Evaluate:82Yxl6741)  Requested for: 67Obn8563; Last   Rx:61Iwf9329 Ordered    Allergies    1  ASA/Extra Strength Antacid TABS   2  Aspirin EC TBEC    3  No Known Environmental Allergies   4   No Known Food Allergies    Signatures   Electronically signed by : Demarcus Evans RN; Jun 8 2017  9:09AM EST                       (Author)

## 2018-01-14 NOTE — PROGRESS NOTES
2017         RE: Winter Horn                           To: Petr Garner   MR#: [id number]                                   [address] [place] [address]   : [address]  ENC: [id number] Rice, [address] [place] [address] Jason Marquez   (Exam #: [id number])                           Fax: [phone]      The LMP of this 32year old,  G3, P2-0-0-2 patient was 2016, giving   her an TOBIAS of 2017 and a current gestational age of 29 weeks 4 days   by dates  A sonographic examination was performed on 2017 using   real time equipment  The ultrasound examination was performed using   abdominal technique  The patient has a BMI of 25 7  Her blood pressure   today was 113/77  Earliest ultrasound found in her record: 16  9w2d 17 TOBIAS      Kris Sky has no complaints today  She reports regular fetal movements and   denies problems related to hypertension,   labor, or vaginal   bleeding  Screening for gestational diabetes on  revealed an   elevated one-hour post Glucola value of 161 mg/dL  A diagnostic three-hour   glucose tolerance test performed on  revealed 2 out of 4   abnormal values, consistent with a diagnosis of gestational diabetes  Kris Sky had her initial visit with the Diabetes and Pregnancy program in   the [facility]  yesterday  Her fasting blood glucose value this   morning was 75 mg/dL        Cardiac motion was observed at 151 bpm       INDICATIONS      diabetes, gestational, A1      Exam Types      Level I      RESULTS      Fetus # 1 of 1   Vertex presentation   Fetal growth appeared normal   Placenta Location = Anterior   No placenta previa   Placenta Grade = II      MEASUREMENTS (* Included In Average GA)      AC              30 7 cm        34 weeks 6 days* (68%)   BPD              7 8 cm        31 weeks 3 days* (5%)   HC 28 7 cm        31 weeks 1 day * (<5%)   Femur            6 4 cm        33 weeks 0 days* (43%)      Cerebellum       4 5 cm        35 weeks 4 days      HC/AC           0 93   FL/AC           0 21   FL/BPD          0 82   EFW (Ac/Fl/Hc)  2241 grams - 4 lbs 15 oz                 (44%)      THE AVERAGE GESTATIONAL AGE is 32 weeks 4 days +/- 18 days  AMNIOTIC FLUID      Q1: 3 5      Q2: 7 1      Q3: 3 2      Q4: 2 0   LIONEL Total = 15 8 cm   Amniotic Fluid: Normal      ANATOMY DETAILS      Visualized Appearing Sonographically Normal:   HEAD: (Calvarium, BPD Level, Cavum, Lateral Ventricles, Cerebellum,   Cisterna Magna);    TH  CAV : (Diaphragm); HEART: (Four Chamber View,   Proximal Left Outflow, Proximal Right Outflow, 3VV, Cardiac Axis, Cardiac   Position);    STOMACH, RIGHT KIDNEY, LEFT KIDNEY, BLADDER, PLACENTA      IMPRESSION      Perkins IUP   32 weeks and 4 days by this ultrasound  (TOBIAS=APR 14 2017)   Vertex presentation   Fetal growth appeared normal   Regular fetal heart rate of 151 bpm   Anterior placenta   No placenta previa      GENERAL COMMENT      No fetal structural abnormality is identified on the Level I survey today  Fetal interval growth and amniotic fluid volume are normal       Today's ultrasound findings and suggested follow-up were discussed in   detail with Breanna  She will return to the [facility]  in 4 weeks   to assess fetal interval growth  Daily third trimester fetal kick   counting was discussed at the visit today  Breanna should maintain   contact with the Diabetes and Pregnancy program in the [facility]  at   least once a week for review of her blood glucose values  The diagnosis of   gestational diabetes during this pregnancy is associated with an increased   risk for the development of overt, Type II diabetes later in Amna's   life  Her risk for developing Type II diabetes is as high as 50%   A 75   gram, two-hour, OGTT is suggested as initial follow up 6-12 weeks   following delivery  The face to face time, in addition to time spent discussing ultrasound   results, was approximately 10 minutes, greater than 50% of which was spent   during counseling and coordination of care  LANDEN Persaud M D     Maternal-Fetal Medicine   Electronically signed 02/21/17 08:45

## 2018-01-15 NOTE — RESULT NOTES
Verified Results  (1) SEQUENTIAL SCREEN 2 [id number] 09:33AM Eldon Varela     Test Name Result Flag Reference   SCAN RESULT      Results available in the [address] [place] [address]

## 2018-01-16 NOTE — MISCELLANEOUS
Message  Spoke to patient on the phone  She is to get her 2 hour blood sugar check  She also stated on the phone she is getting symptoms of vertigo  She is drinking plenty of fluids and taking her PN vitamins  I asked Octaviano Caceres treatment options, patient is to also get a CBC done as soon as possible and to schedule an apt with her PCP tomorrow or Wednesday to follow through with the symptoms  Active Problems    1  Abnormal weight loss (783 21) (R63 4)   2  Breast lump (611 72) (N63)   3  Headache (784 0) (R51)   4  History of gestational diabetes (V12 21) (Z86 32)   5  Nausea (787 02) (R11 0)   6  Need for prophylactic vaccination and inoculation against influenza (V04 81) (Z23)   7  Postpartum follow-up (V24 2) (Z39 2)   8  Screening for STD (sexually transmitted disease) (V74 5) (Z11 3)   9  Spinal headache (349 0) (G97 1)    Current Meds   1  Prenatal Vitamins 0 8 MG Oral Tablet; TAKE 1 TABLET DAILY; Therapy: 61RRJ0787 to (Evaluate:12Goc0980)  Requested for: 92Bro4693; Last   Rx:60Gjf1577 Ordered    Allergies    1  ASA/Extra Strength Antacid TABS   2  Aspirin EC TBEC    3  No Known Environmental Allergies   4   No Known Food Allergies    Signatures   Electronically signed by : Eder Villagomez RN; Jun 5 2017 11:07AM EST                       (Author)

## 2018-01-16 NOTE — MISCELLANEOUS
Message  left message on patients phone, she is to have a 2 hour post partum blood sugar check  I told her the doctors are putting in the script now and to have it done this week if she has time  Active Problems    1  Abnormal weight loss (783 21) (R63 4)   2  Breast lump (611 72) (N63)   3  Headache (784 0) (R51)   4  Nausea (787 02) (R11 0)   5  Need for prophylactic vaccination and inoculation against influenza (V04 81) (Z23)   6  Postpartum follow-up (V24 2) (Z39 2)   7  Screening for STD (sexually transmitted disease) (V74 5) (Z11 3)   8  Spinal headache (349 0) (G97 1)    Current Meds   1  Prenatal Vitamins 0 8 MG Oral Tablet; TAKE 1 TABLET DAILY; Therapy: 11QVW6242 to (Evaluate:09Qhw4326)  Requested for: 67Wnz3159; Last   Rx:14Ipv8865 Ordered    Allergies    1  ASA/Extra Strength Antacid TABS   2  Aspirin EC TBEC    3  No Known Environmental Allergies   4   No Known Food Allergies    Signatures   Electronically signed by : Karen Catalan RN; Jun 5 2017  9:08AM EST                       (Author)

## 2018-01-17 NOTE — PROGRESS NOTES
2016         RE: Josue Christian                           To: Yasmine   MR#: [id number]                                   [address] [place] [address]   : [facility] [address]  ENC: [id number]                             [place], [address] Jason Marquez   (Exam #: [id number])                           Fax: [phone]      The LMP of this 32year old,  G3, P2-0-0-2 patient was 2016, giving   her an TOBIAS of 2017 and a current gestational age of 25 weeks 3 days   by dates  A sonographic examination was performed on 2016 using   real time equipment  The ultrasound examination was performed using   abdominal & vaginal techniques  The patient has a BMI of 24 4  Her blood   pressure today was 123/84  Earliest ultrasound found in her record: 16  9w2d 17 TOBIAS      Cardiac motion was observed at 155 bpm       INDICATIONS      Previous C/S x 2   fetal anatomical survey   abnormal uterine Doppler      Exam Types      LEVEL II   Transvaginal      RESULTS      Fetus # 1 of 1   Transverse presentation   Fetal growth appeared normal   Placenta Location = Anterior   No placenta previa   Placenta Grade = I      MEASUREMENTS (* Included In Average GA)      AC              15 1 cm        20 weeks 0 days* (43%)   BPD              4 4 cm        19 weeks 2 days* (23%)   HC              17 3 cm        19 weeks 6 days* (31%)   Femur            3 5 cm        21 weeks 3 days* (62%)      Nuchal Fold      3 4 mm      Humerus          3 4 cm        21 weeks 3 days  (71%)      Cerebellum       2 1 cm        20 weeks 6 days   Biorbit          3 0 cm        19 weeks 4 days   CisternaMagna    3 4 mm      HC/AC           1 15   FL/AC           0 24   FL/BPD          0 81   EFW (Ac/Fl/Hc)   360 grams - 0 lbs 13 oz      THE AVERAGE GESTATIONAL AGE is 20 weeks 1 day +/- 10 days        AMNIOTIC FLUID         Largest Vertical Pocket = 3 9 cm   Amniotic Fluid: Normal      UTERINE ARTERIES                                  S/D   PI    RI    NOTCH       Left Uterine Artery        2 97  1 30  0 66   No       Right Uterine Artery       2 07  0 78  0 52   No      CERVICAL EVALUATION      The cervix appeared normal (Ultrasound Examination)  SUPINE      Cervical Length: 3 90 cm      OTHER TEST RESULTS           Funneling?: No             Dynamic Changes?: No        Resp  To TFP?: No      ANATOMY      Head                                    Normal   Face/Neck                               Normal   Th  Cav  Normal   Heart                                   Normal   Abd  Cav  Normal   Stomach                                 Normal   Right Kidney                            Normal   Left Kidney                             Normal   Bladder                                 Normal   Abd  Wall                               Normal   Spine                                   Normal   Extrems                                 Normal   Genitalia                               Normal   Placenta                                Normal   Umbl  Cord                              Normal   Uterus                                  Normal   PCI                                     Normal      ANATOMY DETAILS      Visualized Appearing Sonographically Normal:   HEAD: (Calvarium, BPD Level, Cavum, Lateral Ventricles, Choroid Plexus,   Cerebellum, Cisterna Magna);    FACE/NECK: (Neck, Nuchal Fold, Profile,   Orbits, Nose/Lips, Palate, Face);    TH  CAV  : (Lungs, Diaphragm); HEART: (Four Chamber View, Proximal Left Outflow, Proximal Right Outflow,   3VV, 3 Vessel Trachea, Short Axis of Greater Vessels, Ductal Arch, Aortic   Arch, Interventricular Septum, Interatrial Septum, IVC, SVC, Cardiac Axis,   Cardiac Position);    ABD  CAV : (Liver);    STOMACH, RIGHT KIDNEY, LEFT   KIDNEY, BLADDER, ABD   WALL, SPINE: (Cervical Spine, Thoracic Spine, Lumbar   Spine, Sacrum);    EXTREMS: (Lt Humerus, Rt Humerus, Lt Forearm, Rt   Forearm, Lt Hand, Rt Hand, Lt Femur, Rt Femur, Lt Low Leg, Rt Low Leg, Lt   Foot, Rt Foot);    GENITALIA (Female), PLACENTA, UMBL  CORD, UTERUS, PCI      ADNEXA      The left ovary appeared normal and measured 3 5 x 3 0 x 2 6 cm with a   volume of 14 3 cc  The right ovary appeared normal and measured 3 0 x 2 9   x 2 2 cm with a volume of 10 0 cc  IMPRESSION      Perkins IUP   20 weeks and 1 day by this ultrasound  (TOBIAS=APR 9 2017)   Transverse presentation   Fetal growth appeared normal   Normal anatomy survey   Regular fetal heart rate of 155 bpm   Anterior placenta   No placenta previa      GENERAL COMMENT      OFFICE VISIT      On exam today the patient appears well, in no acute distress, and denies   any complaints  Her abdomen is non-tender  The patient had Sequential Screening at our Center   Her risk for trisomy   21 before screening was 1:960, after screening her risk is 1:4700; her   risk for Trisomy 25 before screening was 1:3700, after screening her risk   is 1:71207  The open neural tube defect risk after screening is 1:6000  The fetal anatomic survey is complete  There is no sonographic evidence   of fetal abnormalities at this time  Good fetal movement and tone are   seen  The amniotic fluid volume appears normal   The placenta is anterior   fundal and it appears sonographically normal   A transvaginal ultrasound   was performed to assess the cervix, which was not seen well   transabdominally  The cervical length was 3 9 centimeters, which is   normal for the current gestational age  There was no significant   funneling or dynamic changes appreciated  The patient was informed of   today's findings and all of her questions were answered  The limitations   of ultrasound were reviewed with the patient, which she appears to   understand        No notching of either uterine artery waveform is appreciated and resistive   indices are normal   The patient had previously had a first trimester   abnormal uterine artery Dopplers  Given the normalization of those   Dopplers today without initiating low dose aspirin therapy because of an   allergy, the patient does not appear to be admitted increased risk for   ischemic placental disease  Please note, in addition to the time spent discussing the results of the   ultrasound, I spent approximately 10 minutes of face-to-face time with the   patient, greater than 50% of which was spent in counseling and the   coordination of care for this patient  Thank you very much for allowing us to participate in the care of this   very nice patient  Should you have any questions, please do not hesitate   to contact our office  Dauna Gain, R D M S Verne Cheadle, M D     Electronically signed 11/21/16 10:08

## 2018-01-18 NOTE — MISCELLANEOUS
Provider Comments  Provider Comments:   Shaniqua Gage did not show up for her scheduled Level I ultrasound appointment in the [address] [place] [address] in [facility] on Wednesday morning, March 22, 2017, at 8:30 AM  Additionally, she did not call to reschedule this appointment        Signatures   Electronically signed by : KRISTOPHER Perez ; Mar 22 2017  9:34AM EST                       (Author)

## 2018-01-22 VITALS — BODY MASS INDEX: 25.92 KG/M2 | WEIGHT: 151 LBS | SYSTOLIC BLOOD PRESSURE: 100 MMHG | DIASTOLIC BLOOD PRESSURE: 69 MMHG

## 2018-01-22 VITALS — BODY MASS INDEX: 26.09 KG/M2 | SYSTOLIC BLOOD PRESSURE: 100 MMHG | DIASTOLIC BLOOD PRESSURE: 69 MMHG | WEIGHT: 152 LBS

## 2018-01-22 VITALS — BODY MASS INDEX: 26.26 KG/M2 | DIASTOLIC BLOOD PRESSURE: 73 MMHG | WEIGHT: 153 LBS | SYSTOLIC BLOOD PRESSURE: 108 MMHG

## 2018-01-22 VITALS — BODY MASS INDEX: 25.75 KG/M2 | WEIGHT: 150 LBS | SYSTOLIC BLOOD PRESSURE: 106 MMHG | DIASTOLIC BLOOD PRESSURE: 67 MMHG

## 2018-01-22 VITALS — BODY MASS INDEX: 25.75 KG/M2 | SYSTOLIC BLOOD PRESSURE: 109 MMHG | WEIGHT: 150 LBS | DIASTOLIC BLOOD PRESSURE: 65 MMHG

## 2018-01-22 VITALS — SYSTOLIC BLOOD PRESSURE: 102 MMHG | BODY MASS INDEX: 22.14 KG/M2 | WEIGHT: 129 LBS | DIASTOLIC BLOOD PRESSURE: 72 MMHG

## 2018-03-07 NOTE — PROGRESS NOTES
[employer] Education 2nd Trimester:   Second Trimester Education provided:   benefits of breastfeeding, importance of exclusive breastfeeding, early initiation of breastfeeding and exclusive breastfeeding for the first 6 months  Prenatal education provided by: Evelyn Esparza   Electronically signed by :  NICOLE Mace; Jan 12 2017  9:06AM EST                       (Author)

## 2018-03-07 NOTE — PROGRESS NOTES
Education  [employer] Education 3rd Trimester: Third Trimester Education provided:   benefits of breastfeeding, importance of exclusive breastfeeding, early initiation of breastfeeding, exclusive breastfeeding for the first 6 months, frequent feedings for optimal milk production, feeding on demand/baby-led feedings, effective positioning and attachment, importance of breastfeeding after 6 months following introduction of other foods, importance of early skin-to-skin contact and 28 week packet given  rooming-in on 24 hour basis Pregnancy Essentials Reference Guide given   The patient is planning on breastfeeding  The patient is planning on exclusively breastfeeding until the baby is 10months of age  Mother has not registered for prenatal class  Thought has been given to selecting a pediatrician  The mother has discussed personal preferences with her provider  Prenatal education provided by: Luana Longo      Signatures   Electronically signed by :  NICOLE Brock; Mar 20 2017 10:20AM EST                       (Acknowledgement)

## 2018-03-07 NOTE — PROGRESS NOTES
Education  [employer] Education Post Partum:   Post Partum Education provided:   benefits of breastfeeding, importance of exclusive breastfeeding, exclusive breastfeeding for the first 6 months, importance of breastfeeding after 6 months following introduction of other foods, frequent feedings for optimal milk production, feeding on demand/baby-led feedings and effective positioning and attachment  The patient is breastfeeding  The patient is planning on exclusively breastfeeding until the baby is 10months of age  The patient is not formula feeding  The baby has seen a pediatrician  The pediatrician is in the network Pediatrician name: [facility]      Signatures   Electronically signed by : NICOLE Go;  Apr 26 2017 10:51AM EST                       (Acknowledgement)

## 2018-07-16 ENCOUNTER — OFFICE VISIT (OUTPATIENT)
Dept: URGENT CARE | Age: 28
End: 2018-07-16
Payer: COMMERCIAL

## 2018-07-16 VITALS
TEMPERATURE: 97.9 F | OXYGEN SATURATION: 100 % | HEART RATE: 70 BPM | BODY MASS INDEX: 21.26 KG/M2 | SYSTOLIC BLOOD PRESSURE: 103 MMHG | DIASTOLIC BLOOD PRESSURE: 71 MMHG | HEIGHT: 63 IN | WEIGHT: 120 LBS | RESPIRATION RATE: 16 BRPM

## 2018-07-16 DIAGNOSIS — H00.011 HORDEOLUM EXTERNUM OF RIGHT UPPER EYELID: Primary | ICD-10-CM

## 2018-07-16 DIAGNOSIS — M26.621 ARTHRALGIA OF RIGHT TEMPOROMANDIBULAR JOINT: ICD-10-CM

## 2018-07-16 PROCEDURE — 99213 OFFICE O/P EST LOW 20 MIN: CPT | Performed by: PHYSICIAN ASSISTANT

## 2018-07-16 RX ORDER — LORATADINE 10 MG/1
1 TABLET ORAL
COMMUNITY
Start: 2017-06-09 | End: 2018-07-16 | Stop reason: SDUPTHER

## 2018-07-16 RX ORDER — LORATADINE 10 MG/1
10 TABLET ORAL DAILY
COMMUNITY

## 2018-07-16 RX ORDER — FLUTICASONE PROPIONATE 50 MCG
1 SPRAY, SUSPENSION (ML) NASAL 2 TIMES DAILY
COMMUNITY
Start: 2017-06-09

## 2018-07-16 RX ORDER — OFLOXACIN 3 MG/ML
1 SOLUTION/ DROPS OPHTHALMIC 4 TIMES DAILY
Qty: 5 ML | Refills: 0 | Status: SHIPPED | OUTPATIENT
Start: 2018-07-16 | End: 2018-07-23

## 2018-07-16 NOTE — PATIENT INSTRUCTIONS
Use medication as prescribed  Use warm compresses to cause stye to drain  If symptoms do not improve for in 4-5 days schedule appointment with eye doctor  Call Hilda Davey to schedule an appointment: [phone]  Go to ER if new or worsening symptoms occur  Use ibuprofen, and ice to relieve TMJ pain  Follow-up with dentist     Lizabeth   WHAT YOU NEED TO KNOW:   A stye is a lump on the edge or inside of your eyelid caused by an infection  A stye can form on your upper or lower eyelid  It usually goes away in 2 to 4 days  DISCHARGE INSTRUCTIONS:   Medicines:   · Antibiotic medicine: This is given as an ointment to put into your eye  It is used to fight an infection caused by bacteria  Use as directed  · Take your medicine as directed  Contact your healthcare provider if you think your medicine is not helping or if you have side effects  Tell him of her if you are allergic to any medicine  Keep a list of the medicines, vitamins, and herbs you take  Include the amounts, and when and why you take them  Bring the list or the pill bottles to follow-up visits  Carry your medicine list with you in case of an emergency  Follow up with your healthcare provider as directed:  Write down your questions so you remember to ask them during your visits  Self-care:   · Use warm compresses: This will help decrease swelling and pain  Wet a clean washcloth with warm water and place it on your eye for 10 to 15 minutes, 3 to 4 times each day or as directed  · Keep your hands away from your eye: This helps to prevent the spread of the infection to other parts of the eye  Wash your hands often with soap and dry with a clean towel  Do not squeeze the stye  · Do not use eye makeup:  Do not wear eye makeup while you have a stye  Eye makeup may carry bacteria and cause another stye  Throw away eye makeup and brushes used to apply the makeup  Use new eye makeup after the stye has gone away   Do not share eye makeup with others  · Prevent another stye:  Wash your face and clean your eyelashes every day  Remove eye makeup with makeup remover  This helps to completely remove eye makeup without heavy rubbing  Contact your healthcare provider if:   · You have redness and discharge around your eye, and your eye pain is getting worse  · Your vision changes  · The stye has not gone away within 7 days  · The stye comes back within a short period of time after treatment  · You have questions or concerns about your condition or care  © 2017 [employer] Information is for End User's use only and may not be sold, redistributed or otherwise used for commercial purposes  All illustrations and images included in CareNotes® are the copyrighted property of A [employer] M [employer]  or [employer]  The above information is an  only  It is not intended as medical advice for individual conditions or treatments  Talk to your doctor, nurse or pharmacist before following any medical regimen to see if it is safe and effective for you  Temporomandibular Disorder   WHAT YOU NEED TO KNOW:   Temporomandibular disorder is a condition that causes pain in your jaw  The disorder affects the joint between your temporal bone and your mandible (jawbone)  The muscles and nerves around the joint are also affected  DISCHARGE INSTRUCTIONS:   Medicines:   · Pain medicine: You may be given a prescription medicine to decrease pain  Do not wait until the pain is severe before you take this medicine  · NSAIDs:  These medicines decrease swelling and pain  You can buy NSAIDs without a doctor's order  Ask your healthcare provider which medicine is right for you, and how much to take  Take as directed  NSAIDs can cause stomach bleeding or kidney problems if not taken correctly  · Muscle relaxers  help decrease pain and muscle spasms  · Take your medicine as directed    Contact your healthcare provider if you think your medicine is not helping or if you have side effects  Tell him of her if you are allergic to any medicine  Keep a list of the medicines, vitamins, and herbs you take  Include the amounts, and when and why you take them  Bring the list or the pill bottles to follow-up visits  Carry your medicine list with you in case of an emergency  Follow up with your healthcare provider as directed:  Write down your questions so you remember to ask them during your visits  Manage your symptoms:   · Eat soft foods: Your healthcare provider may suggest that you eat only soft foods for several days  A dietitian may work with you to find foods that are easier to bite, chew, or swallow  Examples are soup, applesauce, cottage cheese, pudding, yogurt, and soft fruits  · Use jaw supporting devices:  Splints may be used to support your jaw or keep your jaw from moving  You may need to wear a mouth guard to keep you from clenching or grinding your teeth while you are sleeping  · Use ice and heat:  Ice helps decrease swelling and pain  Ice may also help prevent tissue damage  Use an ice pack, or put crushed ice in a plastic bag  Cover it with a towel and place it on your jaw for 15 to 20 minutes every hour or as directed  After the first 24 to 48 hours, use heat to decrease pain, swelling, and muscle spasms  Apply heat on the area for 20 to 30 minutes every 2 hours for as many days as directed  Use a heating pad, moist warm compress, or a hot water bottle  · Go to physical therapy:  A physical therapist teaches you exercises to help improve movement and strength, and to decrease pain in your jaw  A speech therapist may help you with swallowing and speech exercises  Contact your healthcare provider if:   · You have a fever  · Your splint or mouth guard is loose  · You have questions or concerns about your condition or care    Return to the emergency department if:   · You have nausea, are vomiting, or cannot keep liquids down  · You have pain that does not go away even after you take your pain medicine  · You have problems breathing, talking, drinking, eating, or swallowing  · Your splint or mouth guard gets damaged or broken  © 2017 [employer] Information is for End User's use only and may not be sold, redistributed or otherwise used for commercial purposes  All illustrations and images included in CareNotes® are the copyrighted property of [employer]  or [employer]  The above information is an  only  It is not intended as medical advice for individual conditions or treatments  Talk to your doctor, nurse or pharmacist before following any medical regimen to see if it is safe and effective for you

## 2018-07-16 NOTE — PROGRESS NOTES
[facility]        NAME: Susan Maldonado is a 29 y o  female  : 1990    MRN: [id number]  DATE: 2018  TIME: 5:42 PM    Assessment and Plan   Hordeolum externum of right upper eyelid [H00 011]  1  Hordeolum externum of right upper eyelid  ofloxacin (OCUFLOX) 0 3 % ophthalmic solution   2  Arthralgia of right temporomandibular joint           Patient Instructions     Use medication as prescribed  Use warm compresses to cause stye to drain  If symptoms do not improve for in 4-5 days schedule appointment with eye doctor  Call Bryan Falk to schedule an appointment: [phone]  Go to ER if new or worsening symptoms occur  Use ibuprofen, and ice to relieve TMJ pain  Follow-up with dentist     Chief Complaint     Chief Complaint   Patient presents with    Eye Problem     Pt c/o right eye swelling and right-sided facial pain x 5 days  History of Present Illness       Eye Problem    The right eye is affected  This is a new problem  Episode onset: Four days ago  The problem occurs constantly  The problem has been unchanged  There was no injury mechanism  Pain scale: Not painful just uncomfortable  There is no known exposure to pink eye  Associated symptoms include a foreign body sensation  Pertinent negatives include no blurred vision, eye discharge, double vision, eye redness, fever, itching, nausea, photophobia or vomiting  Associated symptoms comments: Swollen right upper eyelid    Treatments tried: Claritin  The treatment provided no relief  Mouth Injury Primary symptoms do not include headaches, fever, shortness of breath or cough  Primary symptoms comment: Right sided jaw pain and clicking  Sherre Soulier Episode onset: 4-5 days ago  The symptoms are unchanged  The symptoms occur constantly  Additional symptoms include: jaw pain   Additional symptoms do not include: dental sensitivity to temperature, gum swelling, gum tenderness, purulent gums, facial swelling, trouble swallowing, pain with swallowing, excessive salivation, drooling, ear pain, hearing loss, nosebleeds, swollen glands and fatigue  Review of Systems   Review of Systems   Constitutional: Negative for chills, diaphoresis, fatigue and fever  HENT: Negative for congestion, dental problem, drooling, ear pain, facial swelling, hearing loss, nosebleeds, rhinorrhea, sinus pressure, trouble swallowing and voice change  Eyes: Negative  Negative for blurred vision, double vision, photophobia, discharge, redness, itching and visual disturbance  Respiratory: Negative for cough, chest tightness and shortness of breath  Cardiovascular: Negative for chest pain and palpitations  Gastrointestinal: Negative for constipation, diarrhea, nausea and vomiting  Endocrine: Negative  Genitourinary: Negative for dysuria  Musculoskeletal: Negative for back pain, myalgias and neck pain  Skin: Negative for pallor and rash  Allergic/Immunologic: Negative  Neurological: Negative for dizziness, syncope and headaches  Hematological: Negative  Psychiatric/Behavioral: Negative            Current Medications       Current Outpatient Prescriptions:     fluticasone (FLONASE) 50 mcg/act nasal spray, 1 spray into each nostril 2 (two) times a day, Disp: , Rfl:     loratadine (CLARITIN) 10 mg tablet, Take 10 mg by mouth daily, Disp: , Rfl:     docusate sodium (COLACE) 100 mg capsule, Take 1 capsule by mouth 2 (two) times a day for 30 days, Disp: 60 capsule, Rfl: 0    ibuprofen (MOTRIN) 600 mg tablet, Take 1 tablet by mouth every 6 (six) hours as needed for mild pain for up to 30 days, Disp: 30 tablet, Rfl: 0    ofloxacin (OCUFLOX) 0 3 % ophthalmic solution, Administer 1 drop to the right eye 4 (four) times a day for 7 days, Disp: 5 mL, Rfl: 0    Prenatal Vit-Fe Fumarate-FA (PRENATAL VITAMIN PO), Take 1 tablet by mouth daily, Disp: , Rfl:     Current Allergies     Allergies as of 07/16/2018 - Reviewed 07/16/2018 Allergen Reaction Noted    Aspirin Rash 03/10/2016            The following portions of the patient's history were reviewed and updated as appropriate: allergies, current medications, past family history, past medical history, past social history, past surgical history and problem list      Past Medical History:   Diagnosis Date    Acute upper respiratory infection     Anemia     Gestational diabetes     Gestational       Past Surgical History:   Procedure Laterality Date     SECTION      SD  DELIVERY ONLY N/A 3/31/2017    Procedure: Cassaundra Rubinstein () REPEAT;  Surgeon: Sherryle Hans, MD;  Location: [facility];  Service: Obstetrics    SD LIGATE FALLOPIAN TUBE,POSTPARTUM Bilateral 3/31/2017    Procedure: LIGATION Jessica Maury TUBAL POST-PARTUM;  Surgeon: Sherryle Hans, MD;  Location: [facility];  Service: Obstetrics       History reviewed  No pertinent family history  Medications have been verified  Objective   /71   Pulse 70   Temp 97 9 °F (36 6 °C) (Tympanic)   Resp 16   Ht 5' 3" (1 6 m)   Wt 54 4 kg (120 lb)   LMP 2018   SpO2 100%   BMI 21 26 kg/m²        Physical Exam     Physical Exam   Constitutional: She appears well-developed and well-nourished  No distress  HENT:   Head: Normocephalic and atraumatic  Right Ear: External ear normal    Left Ear: External ear normal    Nose: Nose normal    Mouth/Throat: Oropharynx is clear and moist  No oropharyngeal exudate  Eyes: Conjunctivae and EOM are normal  Pupils are equal, round, and reactive to light  Right eye exhibits no discharge  Left eye exhibits no discharge  Right conjunctiva is not injected  Right conjunctiva has no hemorrhage  Left conjunctiva is not injected  Left conjunctiva has no hemorrhage  Right eye exhibits normal extraocular motion  Left eye exhibits normal extraocular motion  Neck: Normal range of motion  Neck supple     Cardiovascular: Normal rate, regular rhythm, normal heart sounds and intact distal pulses  Pulmonary/Chest: Effort normal and breath sounds normal  No respiratory distress  She has no wheezes  She has no rales  Lymphadenopathy:     She has no cervical adenopathy  Skin: Skin is warm  No rash noted  She is not diaphoretic  Nursing note and vitals reviewed

## 2019-10-17 ENCOUNTER — OFFICE VISIT (OUTPATIENT)
Dept: FAMILY MEDICINE CLINIC | Facility: CLINIC | Age: 29
End: 2019-10-17

## 2019-10-17 ENCOUNTER — TELEPHONE (OUTPATIENT)
Dept: FAMILY MEDICINE CLINIC | Facility: CLINIC | Age: 29
End: 2019-10-17

## 2019-10-17 VITALS
DIASTOLIC BLOOD PRESSURE: 78 MMHG | OXYGEN SATURATION: 98 % | HEART RATE: 88 BPM | SYSTOLIC BLOOD PRESSURE: 100 MMHG | TEMPERATURE: 98.1 F | WEIGHT: 125.4 LBS | RESPIRATION RATE: 16 BRPM | BODY MASS INDEX: 22.21 KG/M2

## 2019-10-17 DIAGNOSIS — J30.9 ALLERGIC RHINITIS, UNSPECIFIED SEASONALITY, UNSPECIFIED TRIGGER: ICD-10-CM

## 2019-10-17 DIAGNOSIS — Z00.00 WELL ADULT EXAM: Primary | ICD-10-CM

## 2019-10-17 DIAGNOSIS — Z23 NEED FOR INFLUENZA VACCINATION: ICD-10-CM

## 2019-10-17 DIAGNOSIS — Z12.4 CERVICAL CANCER SCREENING: ICD-10-CM

## 2019-10-17 PROCEDURE — 99395 PREV VISIT EST AGE 18-39: CPT | Performed by: PHYSICIAN ASSISTANT

## 2019-10-17 RX ORDER — FLUTICASONE PROPIONATE 50 MCG
1 SPRAY, SUSPENSION (ML) NASAL DAILY
Qty: 1 BOTTLE | Refills: 5 | Status: SHIPPED | OUTPATIENT
Start: 2019-10-17

## 2019-10-17 NOTE — PROGRESS NOTES
Assessment/Plan:    No problem-specific Assessment & Plan notes found for this encounter  Problem List Items Addressed This Visit     None      Visit Diagnoses     Well adult exam    -  Primary    Need for influenza vaccination        Relevant Orders    influenza vaccine, 2848-4105, quadrivalent, 0 5 mL, preservative-free, for adult and pediatric patients 6 mos+ (AFLURIA, FLUARIX, FLULAVAL, FLUZONE)    Cervical cancer screening        Relevant Orders    Ambulatory referral to Gynecology    Allergic rhinitis, unspecified seasonality, unspecified trigger        Relevant Medications    fluticasone (FLONASE) 50 mcg/act nasal spray        Recommend to have yearly eye exam   Declined influenza vaccine  Subjective:      Patient ID: Domonique Deleon is a 34 y o  female  HPI  63-year-old female here for wellness visit also because there is some concern she may problems with her right ear  She was having upper tooth pain on the right and the dentist told her that her teeth were normal however but her on amoxicillin     She gets pressure by both her ears  She has been getting pressure in her head  No symtpoms this week after she finished the amoxicillin  She was taking flonase and loratadine in the past      Exercise: yoga and active daily  Diet:Varied and well rounded  Dentist: [facility]  Eye: Last  2 years ago  Pap: due   The following portions of the patient's history were reviewed and updated as appropriate:   She  has a past medical history of Anemia and Gestational diabetes    She   Patient Active Problem List    Diagnosis Date Noted    Spinal headache 2017    39 weeks gestation of pregnancy 2017    Hx of  section 2017    Diet controlled gestational diabetes mellitus (GDM) in third trimester 2017     delivery delivered 2017    Sterilization 2017     She  has a past surgical history that includes  section; pr  delivery only (N/A, 3/31/2017); and pr ligate fallopian tube,postpartum (Bilateral, 3/31/2017)  Her family history is not on file  She  reports that she has never smoked  She has never used smokeless tobacco  She reports that she drinks alcohol  She reports that she does not use drugs  Current Outpatient Medications   Medication Sig Dispense Refill    ibuprofen (MOTRIN) 600 mg tablet Take 1 tablet by mouth every 6 (six) hours as needed for mild pain for up to 30 days 30 tablet 0    docusate sodium (COLACE) 100 mg capsule Take 1 capsule by mouth 2 (two) times a day for 30 days 60 capsule 0    fluticasone (FLONASE) 50 mcg/act nasal spray 1 spray into each nostril 2 (two) times a day      fluticasone (FLONASE) 50 mcg/act nasal spray 1 spray into each nostril daily 1 Bottle 5    loratadine (CLARITIN) 10 mg tablet Take 10 mg by mouth daily      Prenatal Vit-Fe Fumarate-FA (PRENATAL VITAMIN PO) Take 1 tablet by mouth daily       No current facility-administered medications for this visit  Current Outpatient Medications on File Prior to Visit   Medication Sig    ibuprofen (MOTRIN) 600 mg tablet Take 1 tablet by mouth every 6 (six) hours as needed for mild pain for up to 30 days    docusate sodium (COLACE) 100 mg capsule Take 1 capsule by mouth 2 (two) times a day for 30 days    fluticasone (FLONASE) 50 mcg/act nasal spray 1 spray into each nostril 2 (two) times a day    loratadine (CLARITIN) 10 mg tablet Take 10 mg by mouth daily    Prenatal Vit-Fe Fumarate-FA (PRENATAL VITAMIN PO) Take 1 tablet by mouth daily     No current facility-administered medications on file prior to visit  She is allergic to aspirin       Review of Systems   Constitutional: Negative for activity change, appetite change, chills, fatigue and unexpected weight change  HENT: Negative for dental problem, ear pain, hearing loss and sore throat  Eyes: Negative for visual disturbance  Respiratory: Negative for cough and wheezing  Cardiovascular: Negative for chest pain  Gastrointestinal: Negative for abdominal pain, constipation, diarrhea and vomiting  Genitourinary: Negative for difficulty urinating and dysuria  Musculoskeletal: Negative for arthralgias, back pain and myalgias  Skin: Negative for rash  Neurological: Negative for dizziness and headaches  Psychiatric/Behavioral: Negative for behavioral problems  Objective:      /78 (BP Location: Right arm, Patient Position: Sitting, Cuff Size: Adult)   Pulse 88   Temp 98 1 °F (36 7 °C) (Tympanic)   Resp 16   Wt 56 9 kg (125 lb 6 4 oz)   LMP 09/17/2019 (Approximate)   SpO2 98%   Breastfeeding? No   BMI 22 21 kg/m²          Physical Exam   Constitutional: She is oriented to person, place, and time  She appears well-developed and well-nourished  No distress  HENT:   Head: Normocephalic and atraumatic  Right Ear: External ear normal    Left Ear: External ear normal    Nose: Nose normal    Mouth/Throat: Oropharynx is clear and moist    Eyes: Conjunctivae are normal    Neck: Normal range of motion  Neck supple  No thyromegaly present  Cardiovascular: Normal rate, regular rhythm and normal heart sounds  No murmur heard  Pulmonary/Chest: Effort normal and breath sounds normal  No respiratory distress  She has no wheezes  Lymphadenopathy:     She has no cervical adenopathy  Neurological: She is alert and oriented to person, place, and time  Psychiatric: She has a normal mood and affect  Her behavior is normal    Nursing note and vitals reviewed

## 2019-10-17 NOTE — TELEPHONE ENCOUNTER
Left VM for patient regarding appointment scheduled for 10/23/10 at 11:30AM [address] [facility], [address] [facility]

## 2019-10-24 NOTE — TELEPHONE ENCOUNTER
Called pt  Due to her cancelling her appointment today with [facility]  I left VM with our number and [facility] phone number([phone]) if she would like to reschedule

## 2021-01-13 ENCOUNTER — OFFICE VISIT (OUTPATIENT)
Dept: FAMILY MEDICINE CLINIC | Facility: CLINIC | Age: 31
End: 2021-01-13

## 2021-01-13 VITALS
DIASTOLIC BLOOD PRESSURE: 70 MMHG | OXYGEN SATURATION: 97 % | HEART RATE: 70 BPM | BODY MASS INDEX: 23.92 KG/M2 | SYSTOLIC BLOOD PRESSURE: 100 MMHG | TEMPERATURE: 99.2 F | HEIGHT: 62 IN | RESPIRATION RATE: 18 BRPM | WEIGHT: 130 LBS

## 2021-01-13 DIAGNOSIS — H00.012 HORDEOLUM EXTERNUM OF RIGHT LOWER EYELID: Primary | ICD-10-CM

## 2021-01-13 PROCEDURE — 3008F BODY MASS INDEX DOCD: CPT | Performed by: PHYSICIAN ASSISTANT

## 2021-01-13 PROCEDURE — 1036F TOBACCO NON-USER: CPT | Performed by: PHYSICIAN ASSISTANT

## 2021-01-13 PROCEDURE — 99213 OFFICE O/P EST LOW 20 MIN: CPT | Performed by: PHYSICIAN ASSISTANT

## 2021-01-13 RX ORDER — OFLOXACIN 3 MG/ML
1 SOLUTION/ DROPS OPHTHALMIC 4 TIMES DAILY
Qty: 5 ML | Refills: 0 | Status: SHIPPED | OUTPATIENT
Start: 2021-01-13 | End: 2021-01-20

## 2021-01-13 NOTE — LETTER
January 13, 2021     Patient: Marshall Parson   YOB: 1990   Date of Visit: 1/13/2021       To Whom it May Concern:    Marshall Parson is under my professional care  She was seen in my office on 1/13/2021  She may return to work on 1/19/2021  If you have any questions or concerns, please don't hesitate to call           Sincerely,          Liyah Mirza PA-C          CC: No Recipients

## 2021-01-13 NOTE — PATIENT INSTRUCTIONS
Stye   WHAT YOU NEED TO KNOW:   A stye is a lump on the edge or inside of your eyelid caused by an infection  A stye can form on your upper or lower eyelid  It usually goes away in 2 to 4 days  DISCHARGE INSTRUCTIONS:   Medicines:   · Antibiotic medicine: This is given as an ointment to put into your eye  It is used to fight an infection caused by bacteria  Use as directed  · Take your medicine as directed  Contact your healthcare provider if you think your medicine is not helping or if you have side effects  Tell him of her if you are allergic to any medicine  Keep a list of the medicines, vitamins, and herbs you take  Include the amounts, and when and why you take them  Bring the list or the pill bottles to follow-up visits  Carry your medicine list with you in case of an emergency  Follow up with your healthcare provider as directed:  Write down your questions so you remember to ask them during your visits  Self-care:   · Use warm compresses: This will help decrease swelling and pain  Wet a clean washcloth with warm water and place it on your eye for 10 to 15 minutes, 3 to 4 times each day or as directed  · Keep your hands away from your eye: This helps to prevent the spread of the infection to other parts of the eye  Wash your hands often with soap and dry with a clean towel  Do not squeeze the stye  · Do not use eye makeup:  Do not wear eye makeup while you have a stye  Eye makeup may carry bacteria and cause another stye  Throw away eye makeup and brushes used to apply the makeup  Use new eye makeup after the stye has gone away  Do not share eye makeup with others  · Prevent another stye:  Wash your face and clean your eyelashes every day  Remove eye makeup with makeup remover  This helps to completely remove eye makeup without heavy rubbing  Contact your healthcare provider if:   · You have redness and discharge around your eye, and your eye pain is getting worse      · Your vision changes  · The stye has not gone away within 7 days  · The stye comes back within a short period of time after treatment  · You have questions or concerns about your condition or care  © Copyright [address] Information is for End User's use only and may not be sold, redistributed or otherwise used for commercial purposes  All illustrations and images included in CareNotes® are the copyrighted property of A D A M , Inc  or [facility] Marce Paulino   The above information is an  only  It is not intended as medical advice for individual conditions or treatments  Talk to your doctor, nurse or pharmacist before following any medical regimen to see if it is safe and effective for you

## 2021-01-13 NOTE — PROGRESS NOTES
Assessment/Plan:    Hordeolum Externum of right lower eyelid   - Patient has been applying warm compresses with little relief  Since this time has been present for about 3 weeks now, will refer patient to Ophthalmology for further evaluation and possible incision and drainage  Sent message to referral team to assist patient with scheduling this appointment  - Will prescribe ofloxacin ophthalmic solution, to apply 1 drop to the right eye 4 times daily for 7 days   - Advised to call the office or report to ED if symptoms persist, worsen, or new symptoms arise such as vision changes or fevers  Diagnoses and all orders for this visit:    Hordeolum externum of right lower eyelid  -     ofloxacin (OCUFLOX) 0 3 % ophthalmic solution; Administer 1 drop to the right eye 4 (four) times a day for 7 days  -     Ambulatory referral to Ophthalmology; Future      All of patients questions were answered  Patient understands and agrees with the above plan  Return if symptoms worsen or fail to improve  Enedelia Owens PA-C  01/13/21  [facility]           Subjective:     Patient ID: Patricia French  is a 27 y o  female  has a past medical history of Anemia and Gestational diabetes  who presents today in office for  Same-day visit for lump of right eye     - Patient is a 27 y o  female who presents today for   Same-day visit for lump of right eye  Patient notes she has had a small lump of her right lower eyelid for about 3 weeks now  Patient notes at first it did not bother her much, but now it has been painful and itchy  Patient denies any fevers, chills, nasal congestion, cough, shortness of breath, vision changes, double vision, blurry vision, discharge from the eye  Patient does not wear glasses or contacts  Patient notes she has been applying warm compresses to the lump a few times a day with little relief    Patient notes she did have a telemedicine visit with the doctor on Saturday and he prescribed oral Bactrim  Patient notes she started taking the medication on Sunday but she has not noticed a difference with her symptoms  Patient notes she has had a lump like this in the past, but it only lasted for about 1 week  The following portions of the patient's history were reviewed and updated as appropriate: allergies, current medications, past family history, past medical history, past social history, past surgical history and problem list         Review of Systems   Constitutional: Negative for chills, fatigue and fever  HENT: Negative for congestion, ear pain, rhinorrhea and sore throat  Eyes: Positive for pain and itching  Negative for photophobia, discharge, redness and visual disturbance  Respiratory: Negative for cough and shortness of breath  Cardiovascular: Negative for chest pain and palpitations  Gastrointestinal: Negative for abdominal pain, constipation, diarrhea, nausea and vomiting  Genitourinary: Negative for difficulty urinating and dysuria  Skin: Negative for rash  Neurological: Negative for dizziness and headaches  Psychiatric/Behavioral: The patient is not nervous/anxious  Objective:   Vitals:    01/13/21 1332   BP: 100/70   BP Location: Left arm   Patient Position: Sitting   Cuff Size: Standard   Pulse: 70   Resp: 18   Temp: 99 2 °F (37 3 °C)   TempSrc: Temporal   SpO2: 97%   Weight: 59 kg (130 lb)   Height: 5' 2" (1 575 m)         Physical Exam  Vitals signs and nursing note reviewed  Constitutional:       Appearance: She is well-developed  HENT:      Head: Normocephalic and atraumatic  Right Ear: External ear normal       Left Ear: External ear normal       Nose: Nose normal       Mouth/Throat:      Pharynx: Uvula midline  Eyes:      General:         Right eye: Hordeolum present  Left eye: No hordeolum  Extraocular Movements: Extraocular movements intact        Conjunctiva/sclera: Conjunctivae normal       Right eye: Right conjunctiva is not injected  No exudate or hemorrhage  Left eye: Left conjunctiva is not injected  No exudate or hemorrhage  Pupils: Pupils are equal, round, and reactive to light  Neck:      Musculoskeletal: Normal range of motion and neck supple  Cardiovascular:      Rate and Rhythm: Normal rate and regular rhythm  Heart sounds: Normal heart sounds  No murmur  Pulmonary:      Effort: Pulmonary effort is normal       Breath sounds: Normal breath sounds  No wheezing  Skin:     General: Skin is warm and dry  Neurological:      Mental Status: She is alert and oriented to person, place, and time     Psychiatric:         Speech: Speech normal

## 2021-02-21 ENCOUNTER — APPOINTMENT (EMERGENCY)
Dept: CT IMAGING | Facility: HOSPITAL | Age: 31
End: 2021-02-21
Payer: COMMERCIAL

## 2021-02-21 ENCOUNTER — HOSPITAL ENCOUNTER (EMERGENCY)
Facility: HOSPITAL | Age: 31
Discharge: HOME | End: 2021-02-21
Attending: EMERGENCY MEDICINE | Admitting: EMERGENCY MEDICINE
Payer: COMMERCIAL

## 2021-02-21 VITALS
SYSTOLIC BLOOD PRESSURE: 121 MMHG | HEART RATE: 89 BPM | TEMPERATURE: 98.8 F | DIASTOLIC BLOOD PRESSURE: 74 MMHG | OXYGEN SATURATION: 99 % | RESPIRATION RATE: 16 BRPM

## 2021-02-21 DIAGNOSIS — S09.90XA INJURY OF HEAD, INITIAL ENCOUNTER: Primary | ICD-10-CM

## 2021-02-21 PROCEDURE — 70450 CT HEAD/BRAIN W/O DYE: CPT

## 2021-02-21 PROCEDURE — 99284 EMERGENCY DEPT VISIT MOD MDM: CPT | Performed by: PHYSICIAN ASSISTANT

## 2021-02-21 PROCEDURE — 99284 EMERGENCY DEPT VISIT MOD MDM: CPT

## 2021-02-21 PROCEDURE — 72125 CT NECK SPINE W/O DYE: CPT

## 2021-02-21 PROCEDURE — G1004 CDSM NDSC: HCPCS

## 2021-02-21 RX ORDER — BUTALBITAL, ACETAMINOPHEN AND CAFFEINE 50; 325; 40 MG/1; MG/1; MG/1
1 TABLET ORAL EVERY 6 HOURS PRN
Qty: 20 TABLET | Refills: 0 | Status: SHIPPED | OUTPATIENT
Start: 2021-02-21

## 2021-02-21 RX ORDER — BUTALBITAL, ACETAMINOPHEN AND CAFFEINE 50; 325; 40 MG/1; MG/1; MG/1
1 TABLET ORAL ONCE
Status: COMPLETED | OUTPATIENT
Start: 2021-02-21 | End: 2021-02-21

## 2021-02-21 RX ADMIN — BUTALBITAL, ACETAMINOPHEN, AND CAFFEINE 1 TABLET: 50; 325; 40 TABLET ORAL at 11:19

## 2021-02-21 NOTE — ED PROVIDER NOTES
History  Chief Complaint   Patient presents with    Head Injury     pt reports slipping and falling yesterday in her dinning room  Pt reports falling backwards striking her head on the floor  Denies LOC  reports headache, neck pain, and dizziness today, denies nausea or vomiting  Cristina rao     Chevy robles a 26 yo F presenting for evaluation of fall/head injury sustained yesterday  Reports she was walking on her floor at home when she slipped and fell backwards, striking back of head on hardwood floor  No LOC, however reports she felt lightheaded and dizzy initially for several minutes  Patient notes persistent, severe generalized headache since that time  Today she notes light sensitivity and lightheadedness  Patient also notes midline and bilateral neck pain since falling  No nausea/vomiting  Reports taking tylenol with minimal relief  No anticoagulant/anti-platelet agents  History provided by:  Patient   used: No    Fall  Mechanism of injury: fall    Injury location:  Head/neck  Head/neck injury location:  Head  Time since incident:  1 day  Arrived directly from scene: no    Fall:     Fall occurred: Slipped backwards  Height of fall:  Standing    Impact surface:  Hard floor    Point of impact:  Head  Suspicion of alcohol use: no    Suspicion of drug use: no    Prior to arrival data:     Loss of consciousness: no      Amnesic to event: no    Associated symptoms: headaches and neck pain    Associated symptoms: no abdominal pain, no back pain, no blindness, no chest pain, no difficulty breathing, no hearing loss, no loss of consciousness, no nausea, no seizures and no vomiting    Risk factors: no anticoagulation therapy        Prior to Admission Medications   Prescriptions Last Dose Informant Patient Reported? Taking?    Prenatal Vit-Fe Fumarate-FA (PRENATAL VITAMIN PO)  Self Yes No   Sig: Take 1 tablet by mouth daily   docusate sodium (COLACE) 100 mg capsule   No No   Sig: Take 1 capsule by mouth 2 (two) times a day for 30 days   fluticasone (FLONASE) 50 mcg/act nasal spray   Yes No   Si spray into each nostril 2 (two) times a day   fluticasone (FLONASE) 50 mcg/act nasal spray   No No   Si spray into each nostril daily   ibuprofen (MOTRIN) 600 mg tablet   No No   Sig: Take 1 tablet by mouth every 6 (six) hours as needed for mild pain for up to 30 days   loratadine (CLARITIN) 10 mg tablet  Self Yes No   Sig: Take 10 mg by mouth daily      Facility-Administered Medications: None       Past Medical History:   Diagnosis Date    Anemia     Gestational diabetes     Gestational       Past Surgical History:   Procedure Laterality Date     SECTION      ME  DELIVERY ONLY N/A 3/31/2017    Procedure:  SECTION () REPEAT;  Surgeon: Rolanda Kehr, MD;  Location: [facility];  Service: Obstetrics    ME LIGATE FALLOPIAN TUBE,POSTPARTUM Bilateral 3/31/2017    Procedure: LIGATION [facility] TUBAL POST-PARTUM;  Surgeon: Rolanda Kehr, MD;  Location: [facility];  Service: Obstetrics       History reviewed  No pertinent family history  I have reviewed and agree with the history as documented  E-Cigarette/Vaping    E-Cigarette Use Never User      E-Cigarette/Vaping Substances     Social History     Tobacco Use    Smoking status: Never Smoker    Smokeless tobacco: Never Used   Substance Use Topics    Alcohol use: Yes     Frequency: 2-4 times a month     Drinks per session: 1 or 2     Binge frequency: Never     Comment: "one every two weeks"    Drug use: Never       Review of Systems   Constitutional: Negative for chills and fever  HENT: Negative for congestion, hearing loss, rhinorrhea and sore throat  Eyes: Negative for blindness, pain and visual disturbance  Respiratory: Negative for cough, shortness of breath and wheezing  Cardiovascular: Negative for chest pain and palpitations     Gastrointestinal: Negative for abdominal pain, nausea and vomiting  Genitourinary: Negative for dysuria, frequency and urgency  Musculoskeletal: Positive for neck pain  Negative for back pain and neck stiffness  Skin: Negative for rash and wound  Neurological: Positive for light-headedness and headaches  Negative for dizziness, seizures, loss of consciousness, syncope, weakness and numbness  Physical Exam  Physical Exam  Constitutional:       General: She is not in acute distress  Appearance: She is well-developed  She is not diaphoretic  HENT:      Head: Normocephalic and atraumatic  Right Ear: External ear normal       Left Ear: External ear normal    Eyes:      Conjunctiva/sclera: Conjunctivae normal       Pupils: Pupils are equal, round, and reactive to light  Neck:      Musculoskeletal: Normal range of motion and neck supple  Cardiovascular:      Rate and Rhythm: Normal rate and regular rhythm  Heart sounds: Normal heart sounds  No murmur  No friction rub  No gallop  Pulmonary:      Effort: Pulmonary effort is normal  No respiratory distress  Breath sounds: Normal breath sounds  No wheezing  Abdominal:      General: There is no distension  Palpations: Abdomen is soft  Tenderness: There is no abdominal tenderness  Lymphadenopathy:      Cervical: No cervical adenopathy  Skin:     General: Skin is warm and dry  Capillary Refill: Capillary refill takes less than 2 seconds  Findings: No erythema or rash  Neurological:      Mental Status: She is alert and oriented to person, place, and time  GCS: GCS eye subscore is 4  GCS verbal subscore is 5  GCS motor subscore is 6  Cranial Nerves: No cranial nerve deficit, dysarthria or facial asymmetry  Sensory: Sensation is intact  Motor: Motor function is intact  No abnormal muscle tone  Coordination: Coordination normal       Gait: Gait is intact   Gait normal    Psychiatric:         Behavior: Behavior normal          Thought Content: Thought content normal          Judgment: Judgment normal          Vital Signs  ED Triage Vitals   Temperature Pulse Respirations Blood Pressure SpO2   02/21/21 1029 02/21/21 1027 02/21/21 1027 02/21/21 1027 02/21/21 1027   98 8 °F (37 1 °C) 89 16 121/74 99 %      Temp Source Heart Rate Source Patient Position - Orthostatic VS BP Location FiO2 (%)   02/21/21 1029 02/21/21 1027 02/21/21 1027 02/21/21 1027 --   Oral Monitor Sitting Right arm       Pain Score       02/21/21 1119       8           Vitals:    02/21/21 1027   BP: 121/74   Pulse: 89   Patient Position - Orthostatic VS: Sitting         Visual Acuity  Visual Acuity      Most Recent Value   L Pupil Size (mm)  4   R Pupil Size (mm)  4          ED Medications  Medications   butalbital-acetaminophen-caffeine (FIORICET,ESGIC) -40 mg per tablet 1 tablet (1 tablet Oral Given 2/21/21 1119)       Diagnostic Studies  Results Reviewed     None                 CT head without contrast   Final Result by John Rodrigez MD (02/21 1131)      No acute intracranial abnormality  Workstation performed: [id number]         CT spine cervical without contrast   Final Result by John Rodrigez MD (02/21 1129)      No cervical spine fracture or traumatic malalignment  Workstation performed: [id number]                    Procedures  Procedures         ED Course                                           MDM  Number of Diagnoses or Management Options  Injury of head, initial encounter:   Diagnosis management comments: Mechanical fall, head strike yesterday w/o LOC  Persistent severe headache, lightheadedness, light sensitivity since that time  Also notes midline/bilateral cervical pain  CT head/c-spine are without acute traumatic injury  History/exam c/w concussion, likely muscle strain of cervical paraspinal musculature  Will treat supportively at home, recommend rest and limited screen time/external stimuli for several days   Follow up with concussion clinic recommended  Strict return to ED indications discussed with patient, who verbalizes understanding of same  Amount and/or Complexity of Data Reviewed  Tests in the radiology section of CPT®: ordered and reviewed    Patient Progress  Patient progress: stable      Disposition  Final diagnoses:   Injury of head, initial encounter     Time reflects when diagnosis was documented in both MDM as applicable and the Disposition within this note     Time User Action Codes Description Comment    2/21/2021 11:42 AM Shadia Tran Add [S09 [id number]] Injury of head, initial encounter       ED Disposition     ED Disposition Condition Date/Time Comment    Discharge Stable Sun Feb 21, 2021 11:42 AM Juan Donaldson Cori discharge to home/self care              Follow-up Information     Follow up With Specialties Details Why Contact Info Additional Information    Physical Therapy at [address] [place] [address] Physical Therapy Schedule an appointment as soon as possible for a visit   Yobany Maki   [phone] Physical Therapy at [address] [place] [address], [address] [place] [address] Emergency Department Emergency Medicine  If symptoms worsen [facility] [id number]  [address] [place] [address] Emergency Department, [address] [place] [address], [id number]          Discharge Medication List as of 2/21/2021 11:45 AM      START taking these medications    Details   butalbital-acetaminophen-caffeine (FIORICET,ESGIC) -40 mg per tablet Take 1 tablet by mouth every 6 (six) hours as needed for headaches for up to 20 doses, Starting Sun 2/21/2021, Normal         CONTINUE these medications which have NOT CHANGED    Details   docusate sodium (COLACE) 100 mg capsule Take 1 capsule by mouth 2 (two) times a day for 30 days, Starting 4/2/2017, Until Tue 5/2/17, No Print !! fluticasone (FLONASE) 50 mcg/act nasal spray 1 spray into each nostril 2 (two) times a day, Starting Fri 6/9/2017, Historical Med      !! fluticasone (FLONASE) 50 mcg/act nasal spray 1 spray into each nostril daily, Starting Thu 10/17/2019, Normal      ibuprofen (MOTRIN) 600 mg tablet Take 1 tablet by mouth every 6 (six) hours as needed for mild pain for up to 30 days, Starting Sun 4/2/2017, Until Thu 10/17/2019, Print      loratadine (CLARITIN) 10 mg tablet Take 10 mg by mouth daily, Historical Med      Prenatal Vit-Fe Fumarate-FA (PRENATAL VITAMIN PO) Take 1 tablet by mouth daily, Until Discontinued, Historical Med       !! - Potential duplicate medications found  Please discuss with provider  No discharge procedures on file      PDMP Review     None          ED Provider  Electronically Signed by           Beverley Hummel PA-C  02/21/21 4282

## 2021-02-21 NOTE — Clinical Note
Adali Phillipoche Cori was seen and treated in our emergency department on 2/21/2021  Diagnosis:     Amna    She may return on this date: 02/24/2021         If you have any questions or concerns, please don't hesitate to call        Libby Peterson PA-C    ______________________________           _______________          _______________  Hospital Representative                              Date                                Time

## 2021-02-21 NOTE — DISCHARGE INSTRUCTIONS
Please refer to the attached information for strict return instructions  If symptoms worsen or new symptoms develop please return to the ER  Please follow up with our concussion clinic as referred  Use prescribed medication as needed for headache

## 2021-02-22 ENCOUNTER — TELEPHONE (OUTPATIENT)
Dept: FAMILY MEDICINE CLINIC | Facility: CLINIC | Age: 31
End: 2021-02-22

## (undated) DEVICE — SUT VICRYL 3-0 CT-1 36 IN J944H

## (undated) DEVICE — PACK C-SECTION PBDS

## (undated) DEVICE — SUT VICRYL 0 CTX 36 IN J978H

## (undated) DEVICE — SUT VICRYL 0 CT-1 36 IN J946H

## (undated) DEVICE — GLOVE SRG BIOGEL ECLIPSE 6.5

## (undated) DEVICE — ADHESIVE SKN CLSR HISTOACRYL FLEX 0.5ML LF

## (undated) DEVICE — GLOVE INDICATOR PI UNDERGLOVE SZ 6.5 BLUE

## (undated) DEVICE — CHLORAPREP HI-LITE 10.5ML ORANGE

## (undated) DEVICE — SKIN MARKER DUAL TIP WITH RULER CAP, FLEXIBLE RULER AND LABELS: Brand: DEVON

## (undated) DEVICE — SUT MONOCRYL 4-0 PS-2 27 IN Y426H

## (undated) DEVICE — SCD SEQUENTIAL COMPRESSION COMFORT SLEEVE MEDIUM KNEE LENGTH: Brand: KENDALL SCD